# Patient Record
Sex: FEMALE | Race: WHITE | HISPANIC OR LATINO
[De-identification: names, ages, dates, MRNs, and addresses within clinical notes are randomized per-mention and may not be internally consistent; named-entity substitution may affect disease eponyms.]

---

## 2019-08-22 ENCOUNTER — TRANSCRIPTION ENCOUNTER (OUTPATIENT)
Age: 69
End: 2019-08-22

## 2020-11-14 ENCOUNTER — EMERGENCY (EMERGENCY)
Facility: HOSPITAL | Age: 70
LOS: 1 days | Discharge: HOME | End: 2020-11-14
Attending: EMERGENCY MEDICINE
Payer: COMMERCIAL

## 2020-11-14 VITALS
SYSTOLIC BLOOD PRESSURE: 130 MMHG | OXYGEN SATURATION: 98 % | TEMPERATURE: 98 F | DIASTOLIC BLOOD PRESSURE: 75 MMHG | HEART RATE: 70 BPM | RESPIRATION RATE: 16 BRPM

## 2020-11-14 VITALS
WEIGHT: 134.92 LBS | TEMPERATURE: 98 F | HEART RATE: 62 BPM | SYSTOLIC BLOOD PRESSURE: 123 MMHG | RESPIRATION RATE: 17 BRPM | DIASTOLIC BLOOD PRESSURE: 75 MMHG | HEIGHT: 61 IN | OXYGEN SATURATION: 97 %

## 2020-11-14 PROCEDURE — 72125 CT NECK SPINE W/O DYE: CPT | Mod: 26

## 2020-11-14 PROCEDURE — 70450 CT HEAD/BRAIN W/O DYE: CPT

## 2020-11-14 PROCEDURE — 73060 X-RAY EXAM OF HUMERUS: CPT | Mod: 26,RT

## 2020-11-14 PROCEDURE — 70486 CT MAXILLOFACIAL W/O DYE: CPT

## 2020-11-14 PROCEDURE — 73060 X-RAY EXAM OF HUMERUS: CPT

## 2020-11-14 PROCEDURE — 76377 3D RENDER W/INTRP POSTPROCES: CPT

## 2020-11-14 PROCEDURE — 73020 X-RAY EXAM OF SHOULDER: CPT | Mod: 26,59,RT

## 2020-11-14 PROCEDURE — 96372 THER/PROPH/DIAG INJ SC/IM: CPT

## 2020-11-14 PROCEDURE — 76377 3D RENDER W/INTRP POSTPROCES: CPT | Mod: 26

## 2020-11-14 PROCEDURE — 70486 CT MAXILLOFACIAL W/O DYE: CPT | Mod: 26

## 2020-11-14 PROCEDURE — 73020 X-RAY EXAM OF SHOULDER: CPT

## 2020-11-14 PROCEDURE — 99284 EMERGENCY DEPT VISIT MOD MDM: CPT

## 2020-11-14 PROCEDURE — 99284 EMERGENCY DEPT VISIT MOD MDM: CPT | Mod: 25

## 2020-11-14 PROCEDURE — 73030 X-RAY EXAM OF SHOULDER: CPT

## 2020-11-14 PROCEDURE — 73030 X-RAY EXAM OF SHOULDER: CPT | Mod: 26,RT

## 2020-11-14 PROCEDURE — 70450 CT HEAD/BRAIN W/O DYE: CPT | Mod: 26

## 2020-11-14 PROCEDURE — 72125 CT NECK SPINE W/O DYE: CPT

## 2020-11-14 RX ORDER — KETOROLAC TROMETHAMINE 30 MG/ML
15 SYRINGE (ML) INJECTION ONCE
Refills: 0 | Status: DISCONTINUED | OUTPATIENT
Start: 2020-11-14 | End: 2020-11-14

## 2020-11-14 RX ADMIN — Medication 15 MILLIGRAM(S): at 19:17

## 2020-11-14 NOTE — ED PROVIDER NOTE - OBJECTIVE STATEMENT
71yo F denies pmhx presents to ER s/p mechanical fall c/o of right shoulder pain and headache.  Patient was walking through store and tripped over a display causing her to fall forward hitting right side of forehead and landing on right shoulder.  NO LOC, was able to slowly get up.  Denies dizziness, visual changes, back pain, neck pain,  CP, SOB,  n/v, abdominal pain, extremity numbness/tingling, weakness, urinary/bowel incontinence 69yo F denies pmhx presents to ER s/p mechanical fall c/o of right shoulder pain and headache.  Patient was walking through store and tripped over a display causing her to fall forward hitting right side of forehead and landing on right shoulder.  NO LOC, was able to slowly get up.  Denies dizziness, visual changes, back pain, neck pain,  CP, SOB,  n/v, abdominal pain, extremity numbness/tingling, weakness, urinary/bowel incontinence. Denies anticoag use

## 2020-11-14 NOTE — ED PROVIDER NOTE - ATTENDING CONTRIBUTION TO CARE
Patient is a 69 yo F with no chronic medical problems here for evaluation of right shoulder pain after trip and fall while at a store. Patient reports she was in [employer] when she tripped over a metal elevated display and fell onto her right shoulder. She hit the right side of her head. Denies loc. Denies numbness or tingling in her arm. No hip pain. Denies chest pain, shortness of breath, nausea, vomiting. No abdominal pain.     VS noted  Gen. no acute distress, Non toxic   HEENT: EOMI, mmm, + swelling on right superior orbit, ttp  Spine: No C-T-L spine tenderness  Lungs: CTAB/L no C/ W /R   CVS: RRR   Abd; Soft non tender, non distended   Ext: no edema, right shoulder: ROM limited, unable to range. no ttp to elbow, right forearm  Skin: no rash  Neuro AAOx3 non focal clear speech  a/p: right shoulder injury - r/o fracture. Plan for XR of right shoulder and humerus. + head trauma with hematoma to right orbit. plan for ct head, ct-spine, ct max face.   - Luis Miguel TURNER

## 2020-11-14 NOTE — ED PROVIDER NOTE - PATIENT PORTAL LINK FT
You can access the FollowMyHealth Patient Portal offered by [facility] by registering at the following website: [email or web address]. By joining [employer]’s FollowMyHealth portal, you will also be able to view your health information using other applications (apps) compatible with our system.

## 2020-11-14 NOTE — CONSULT NOTE ADULT - SUBJECTIVE AND OBJECTIVE BOX
70yFemale c/o R shoulder pain  s/p mechanical fall. Patient was shopping when she tripped over a display, landing on her R shoulder and striking her head on the way down. Patient denies LOC. Patient denies numbness or tingling in the LUE. Patient denies any other injuries.    PMH:  Osteopenia on Vit D, unable to tolerate bisphosphonates    PSH:  Tubal ligation 1981  Fibrous dysplasia of breast s/p lumpectomy 1999  L tibial plateau ORIF 2006 ([facility])    AH:  codeine (Unknown)    Meds: See med rec    T(C): 36.8 (11-14-20 @ 21:59)  HR: 70 (11-14-20 @ 21:59)  BP: 130/75 (11-14-20 @ 21:59)  RR: 16 (11-14-20 @ 21:59)  SpO2: 98% (11-14-20 @ 21:59)  Wt(kg): --    PE RUE:  Skin intact, no ecchymosis   SILT axillary/med/rad/ulnar  +Motor AIN/PIN/Ulnar/Radial/Musc/Median,   painless wrist ROM, elbow ROM limited 2/2 pain at fracture site  no TTP over medial/lateral epicondyles, radial head, olecranon  shoulder ROM limited 2/2 pain,   2+radial pulse, soft compartments    Secondary:  Ecchymosis and swelling at lateral aspect of R eyebrow  No TTP over bony landmarks, SILT BL, ROM intact BL, distal pulses palpable.    Imaging:  XR demonstrating left 3-part proximal humerus fracture, humeral head located in glenoid      70yFemale with R proximal humerus fracture s/p mechanical fall    Pain control prn  NWB RUE in sling  Encourage active wrist/hand ROM  Ice/elevation  No acute orthopaedic intervention indicated at this time  Remainder of care per ED  Call Dr. Aburto's office on Monday for follow-up appt: [phone]     To be discussed with Dr. Aburto in AM 70yFemale c/o R shoulder pain  s/p mechanical fall. Patient was shopping when she tripped over a display, landing on her R shoulder and striking her head on the way down. Patient denies LOC. Patient denies numbness or tingling in the LUE. Patient denies any other injuries. She previously had her left tibial plateau fixed by Dr. Aburto.     PMH:  Osteopenia on Vit D, unable to tolerate bisphosphonates    PSH:  Tubal ligation 1981  Fibrous dysplasia of breast s/p lumpectomy 1999  L tibial plateau ORIF 2006 ([facility])    AH:  codeine (Unknown)    Meds: See med rec    T(C): 36.8 (11-14-20 @ 21:59)  HR: 70 (11-14-20 @ 21:59)  BP: 130/75 (11-14-20 @ 21:59)  RR: 16 (11-14-20 @ 21:59)  SpO2: 98% (11-14-20 @ 21:59)  Wt(kg): --    PE RUE:  Skin intact, no ecchymosis   SILT axillary/med/rad/ulnar  +Motor AIN/PIN/Ulnar/Radial/Musc/Median,   painless wrist ROM, elbow ROM limited 2/2 pain at fracture site  no TTP over medial/lateral epicondyles, radial head, olecranon  shoulder ROM limited 2/2 pain,   2+radial pulse, soft compartments    Secondary:  Ecchymosis and swelling at lateral aspect of R eyebrow  No TTP over bony landmarks, SILT BL, ROM intact BL, distal pulses palpable.    Imaging:  XR demonstrating left 3-part proximal humerus fracture, humeral head located in glenoid      70yFemale with R proximal humerus fracture s/p mechanical fall    Pain control prn  NWB RUE in sling  Encourage active wrist/hand ROM  Ice/elevation  No acute orthopaedic intervention indicated at this time  Remainder of care per ED  Call Dr. Aburto's office on Monday for follow-up appt: [phone]   Plan discussed with Dr. Aburto, will advise of any changes to the palm.

## 2020-11-14 NOTE — ED PROVIDER NOTE - CARE PLAN
Principal Discharge DX:	Humeral head fracture, right, closed, initial encounter  Goal:	placed in sling, d/c with follow up with orthopedics  Assessment and plan of treatment:	acute, impacted fracture of the humeral neck with extension to the greater tuberosity, placed in sling, d/c with follow up with orthopedics

## 2020-11-14 NOTE — ED PROVIDER NOTE - CARE PROVIDER_API CALL
Sami Aburto  ORTHOPAEDIC SURGERY  [address], [address]  [place] [address]  Phone: [phone]  Fax: [phone]  Follow Up Time:

## 2020-11-14 NOTE — ED PROVIDER NOTE - NSFOLLOWUPINSTRUCTIONS_ED_ALL_ED_FT
Please call Dr. Aburto's office on Monday for a follow up appointment. Continue to use the sling for comfort. You may take ibuprofen (upto 600 mg) every 6 hours for pain as well as tylenol (1000 mg) every 6 hours for pain.

## 2020-11-14 NOTE — ED PROVIDER NOTE - EYES, MLM
Clear bilaterally, pupils equal, round and reactive to light.  1cm round soft tissue hematoma to right upper orbital rim + tenderness

## 2020-11-14 NOTE — ED ADULT NURSE NOTE - OBJECTIVE STATEMENT
71 y/o F BIBA s/o witness trip and fall in [employer] over a display corner that her foot got stuck on, fell forward onto R shoulder and R orbital, no LOC, no anticoagulants. R orbital swelling and pain, no lac/bleeding/abrasion/ecchymosis noted to R orbital. R shoulder pain/guarding, unable to ROM due to pain, no ecchymosis, lac, bleeding, abrasion noted to R shoulder. Pt denies any injuries to legs/hips b/l. No LUE injury. Pt denies headache, dizziness, chest pain, palpitations, cough, SOB, abdominal pain, n/v/d, fevers, chills, weakness at this time. Pt changed to gown. Safety maintained.

## 2020-11-14 NOTE — ED PROVIDER NOTE - CLINICAL SUMMARY MEDICAL DECISION MAKING FREE TEXT BOX
right shoulder injury - r/o fracture. Plan for XR of right shoulder and humerus. + head trauma with hematoma to right orbit. plan for ct head, ct-spine, ct max face.   XR shows acute, impacted fracture of the humeral neck with extension to the greater tuberosity, placed in sling, d/c with follow up with orthopedics right shoulder injury - r/o fracture. Plan for XR of right shoulder and humerus. + head trauma with hematoma to right orbit. plan for ct head, ct-spine, ct max face.  XR shows acute, impacted fracture of the humeral neck with extension to the greater tuberosity, placed in sling, d/c with follow up with orthopedics

## 2020-11-14 NOTE — ED PROVIDER NOTE - MUSCULOSKELETAL MINIMAL EXAM
Dec ROM at right shoulder due to pain.  + swelling at proximal humerus + tenderness at anterior shoulder and proximal humers.  FROM elbow and wrist, sensation intact +radial pulses. FROM LUE, 5/5strength, sensstion intat,  LE; FROM 5/5 strength, sensation intact.  No bony tenderness,/neck supple

## 2020-11-14 NOTE — ED PROCEDURE NOTE - ATTENDING CONTRIBUTION TO CARE
I have participated in and supervised all key portions of the above procedures and agree with the above documentation. - Luis Miguel TURNER

## 2020-11-14 NOTE — ED PROVIDER NOTE - PROGRESS NOTE DETAILS
reviewed imaging, placed patient in sling, consulted ortho Luis Miguel TURNER: Patient seen by ortho, recommending that she be discharged with follow up with orthopedics, Dr. Aburto.

## 2020-11-14 NOTE — ED PROVIDER NOTE - PLAN OF CARE
placed in sling, d/c with follow up with orthopedics acute, impacted fracture of the humeral neck with extension to the greater tuberosity, placed in sling, d/c with follow up with orthopedics

## 2020-11-14 NOTE — ED ADULT NURSE NOTE - NSIMPLEMENTINTERV_GEN_ALL_ED
Implemented All Fall Risk Interventions:  Golden to call system. Call bell, personal items and telephone within reach. Instruct patient to call for assistance. Room bathroom lighting operational. Non-slip footwear when patient is off stretcher. Physically safe environment: no spills, clutter or unnecessary equipment. Stretcher in lowest position, wheels locked, appropriate side rails in place. Provide visual cue, wrist band, yellow gown, etc. Monitor gait and stability. Monitor for mental status changes and reorient to person, place, and time. Review medications for side effects contributing to fall risk. Reinforce activity limits and safety measures with patient and family.

## 2020-11-16 ENCOUNTER — APPOINTMENT (OUTPATIENT)
Dept: ORTHOPEDIC SURGERY | Facility: CLINIC | Age: 70
End: 2020-11-16
Payer: COMMERCIAL

## 2020-11-16 DIAGNOSIS — Z91.81 HISTORY OF FALLING: ICD-10-CM

## 2020-11-16 PROCEDURE — 99202 OFFICE O/P NEW SF 15 MIN: CPT

## 2020-11-16 PROCEDURE — 73030 X-RAY EXAM OF SHOULDER: CPT | Mod: RT

## 2020-11-16 PROCEDURE — 99072 ADDL SUPL MATRL&STAF TM PHE: CPT

## 2020-11-16 NOTE — ADDENDUM
[FreeTextEntry1] : I, Kimmy Ambriz wrote this note acting as a scribe for Dr. Sami Aburto on Nov 16, 2020.

## 2020-11-16 NOTE — PHYSICAL EXAM
[de-identified] : Patient is WDWN, alert, and in no acute distress. Breathing is unlabored. She is grossly oriented to person, place, and time. \par \par Right shoulder:Ecchymosis, edema and tenderness in right shoulder and upper arm\par ROM limited by pain\par Right hand FROM, normal sensation\par  [de-identified] : AP, transcapular, and axillary views of the right shoulder were obtained and revealed comminuted displaced humeral neck fracture. \par There is proximal displacement of the greater tuberosity\par

## 2020-11-16 NOTE — HISTORY OF PRESENT ILLNESS
[de-identified] : Patient is a 70 year old female who presents for evaluation of Rt shoulder pain due to R humeral neck fracture sp fall on Sat 11/14/20. Pt was in a store, carrying goods, when she tripped over the floor and landed forwards sustaining head and right shoulder trauma. She was taken to ED where NCHCT was negative for acute bleed. R shoulder xray with displaced humeral neck fracture. She was placed in a sling and referred to ortho for further eval and management. Pt is taking Ibuprofen for pain relief. No numbness or RUE weakness. She also notes 4th finger distal ecchymosis without pain or edema.She states that she is an active person.

## 2020-11-16 NOTE — END OF VISIT
[FreeTextEntry3] : I, Sami Aburto MD, ordering physician, have read and attest that all the information, medical decision making and discharge instructions within are true and accurate.

## 2020-11-16 NOTE — DISCUSSION/SUMMARY
[de-identified] : The underlying pathophysiology was reviewed with the patient. Treatment options were discussed including sling immobilization  and surgical intervention-ORIF. Thhe patient was advised surgery in order to optimize right shoulder function.\par \par Patient was advised that the fracture will most likely  heal without surgery but she may lose function of her right shoulder.\par All of the patient's questions have been answered. \par Patient has decided to wait a week to get repeat x-rays to determine if she would like surgery.\par A sling was given in the office. \par Patient is advised to sleep sitting up. \par NSAIDs as tolerated. \par Follow Up in a week for repeat x-rays.

## 2020-11-23 ENCOUNTER — APPOINTMENT (OUTPATIENT)
Dept: ORTHOPEDIC SURGERY | Facility: CLINIC | Age: 70
End: 2020-11-23
Payer: COMMERCIAL

## 2020-11-23 PROCEDURE — 73030 X-RAY EXAM OF SHOULDER: CPT | Mod: RT

## 2020-11-23 PROCEDURE — 99214 OFFICE O/P EST MOD 30 MIN: CPT

## 2020-11-23 NOTE — HISTORY OF PRESENT ILLNESS
[de-identified] : Patient is a 70 year old female who presents for evaluation of Rt shoulder pain due to a fall on Sat 11/14/20. Pt was in a store, carrying goods, when she tripped over the floor and landed forwards sustaining head and right shoulder trauma. She was taken to ED where head CT was negative for acute bleed. R shoulder xray revealed a left displaced humeral neck fracture. She was placed in a sling and referred to ortho for further eval and management. Pt is taking Ibuprofen for pain relief. No numbness or RUE weakness. She also notes 4th finger distal ecchymosis without pain or edema.She states that she is an active person. She returns a week later for repeat x-rays. She reports taking Ibuprofen.She was seen last week when Xrays revealed acceptable alignment. She reports increased pain in the right shoulder.

## 2020-11-23 NOTE — PHYSICAL EXAM
[de-identified] : Patient is WDWN, alert, and in no acute distress. Breathing is unlabored. She is grossly oriented to person, place, and time. \par \par Right shoulder:Ecchymosis, edema and tenderness in right shoulder and upper arm\par ROM limited by pain\par Right hand FROM, normal sensation\par  [de-identified] : AP, transcapular, and axillary views of the right shoulder were obtained and revealed comminuted displaced humeral neck fracture. There is proximal displacement of the greater tuberosity. There is significant displacement of the surgical neck fracture compared to the previous x-rays. \par

## 2020-11-23 NOTE — DISCUSSION/SUMMARY
[de-identified] : The underlying pathophysiology was reviewed with the patient. Treatment options were discussed including; nonsurgical and surgical intervention. \par \par The patient wishes to proceed with ORIF of the  comminuted displaced humeral neck fracture at this time. The risks and benefits were reviewed with the patient. All of her questions were answered. She will meet with our surgical scheduler.

## 2020-11-23 NOTE — RETURN TO WORK/SCHOOL
[FreeTextEntry1] : Ms. CK MONTANEZ was seen in the office today on 11/23/2020 and 11/16/2020 evaluated by me for an Orthopedic visit. Please be advised that she will be unable to work until farther notice due to her comminuted displaced humeral neck fracture.\par \par Sincerely, \par Sami Aburto MD

## 2020-11-23 NOTE — ADDENDUM
[FreeTextEntry1] : I, Kimmy Ambriz wrote this note acting as a scribe for Dr. Sami Aburto on Nov 23, 2020.

## 2020-11-25 LAB
ALBUMIN SERPL ELPH-MCNC: 4.2 G/DL
ALP BLD-CCNC: 67 U/L
ALT SERPL-CCNC: 16 U/L
ANION GAP SERPL CALC-SCNC: 9 MMOL/L
APTT BLD: 30.1 SEC
AST SERPL-CCNC: 17 U/L
BASOPHILS # BLD AUTO: 0.06 K/UL
BASOPHILS NFR BLD AUTO: 0.7 %
BILIRUB SERPL-MCNC: 0.3 MG/DL
BUN SERPL-MCNC: 14 MG/DL
CALCIUM SERPL-MCNC: 9.1 MG/DL
CHLORIDE SERPL-SCNC: 104 MMOL/L
CO2 SERPL-SCNC: 28 MMOL/L
CREAT SERPL-MCNC: 0.58 MG/DL
EOSINOPHIL # BLD AUTO: 0.17 K/UL
EOSINOPHIL NFR BLD AUTO: 2 %
GLUCOSE SERPL-MCNC: 93 MG/DL
HCT VFR BLD CALC: 40.3 %
HGB BLD-MCNC: 13 G/DL
IMM GRANULOCYTES NFR BLD AUTO: 0.2 %
INR PPP: 1.01 RATIO
LYMPHOCYTES # BLD AUTO: 1.53 K/UL
LYMPHOCYTES NFR BLD AUTO: 18 %
MAN DIFF?: NORMAL
MCHC RBC-ENTMCNC: 30.4 PG
MCHC RBC-ENTMCNC: 32.3 GM/DL
MCV RBC AUTO: 94.4 FL
MONOCYTES # BLD AUTO: 0.86 K/UL
MONOCYTES NFR BLD AUTO: 10.1 %
NEUTROPHILS # BLD AUTO: 5.87 K/UL
NEUTROPHILS NFR BLD AUTO: 69 %
PLATELET # BLD AUTO: 301 K/UL
POTASSIUM SERPL-SCNC: 4.3 MMOL/L
PROT SERPL-MCNC: 6.8 G/DL
PT BLD: 11.9 SEC
RBC # BLD: 4.27 M/UL
RBC # FLD: 12.5 %
SODIUM SERPL-SCNC: 141 MMOL/L
WBC # FLD AUTO: 8.51 K/UL

## 2020-11-27 ENCOUNTER — OUTPATIENT (OUTPATIENT)
Dept: OUTPATIENT SERVICES | Facility: HOSPITAL | Age: 70
LOS: 1 days | End: 2020-11-27
Payer: MEDICARE

## 2020-11-27 VITALS
HEART RATE: 78 BPM | OXYGEN SATURATION: 99 % | WEIGHT: 134.92 LBS | TEMPERATURE: 98 F | SYSTOLIC BLOOD PRESSURE: 154 MMHG | DIASTOLIC BLOOD PRESSURE: 76 MMHG | HEIGHT: 61 IN | RESPIRATION RATE: 15 BRPM

## 2020-11-27 DIAGNOSIS — Z98.51 TUBAL LIGATION STATUS: Chronic | ICD-10-CM

## 2020-11-27 DIAGNOSIS — S42.211A UNSPECIFIED DISPLACED FRACTURE OF SURGICAL NECK OF RIGHT HUMERUS, INITIAL ENCOUNTER FOR CLOSED FRACTURE: ICD-10-CM

## 2020-11-27 DIAGNOSIS — Z98.890 OTHER SPECIFIED POSTPROCEDURAL STATES: Chronic | ICD-10-CM

## 2020-11-27 DIAGNOSIS — S42.231A 3-PART FRACTURE OF SURGICAL NECK OF RIGHT HUMERUS, INITIAL ENCOUNTER FOR CLOSED FRACTURE: ICD-10-CM

## 2020-11-27 NOTE — H&P PST ADULT - NSANTHOSAYNRD_GEN_A_CORE
No. LALITA screening performed.  STOP BANG Legend: 0-2 = LOW Risk; 3-4 = INTERMEDIATE Risk; 5-8 = HIGH Risk

## 2020-11-27 NOTE — H&P PST ADULT - MUSCULOSKELETAL
details… detailed exam no joint warmth/decreased ROM due to pain/diminished strength/right arm/no joint erythema/no calf tenderness/decreased ROM/no joint swelling

## 2020-11-27 NOTE — H&P PST ADULT - NSICDXPASTMEDICALHX_GEN_ALL_CORE_FT
PAST MEDICAL HISTORY:  Vitamin D deficiency      PAST MEDICAL HISTORY:  Dry eye syndrome of both eyes     Vitamin D deficiency

## 2020-11-27 NOTE — H&P PST ADULT - NSICDXPASTSURGICALHX_GEN_ALL_CORE_FT
PAST SURGICAL HISTORY:  S/P ORIF (open reduction internal fixation) fracture 2006- left tibial plateau    S/P right breast biopsy 1990- benign    S/P tubal ligation 1981

## 2020-11-27 NOTE — H&P PST ADULT - NSICDXFAMILYHX_GEN_ALL_CORE_FT
FAMILY HISTORY:  Family history of pacemaker, father-  Family history of stroke, father-  FHx: aortic aneurysm, father-   FHx: non-Hodgkin's lymphoma, mother -

## 2020-11-27 NOTE — H&P PST ADULT - NSICDXPROBLEM_GEN_ALL_CORE_FT
PROBLEM DIAGNOSES  Problem: Fracture of surgical neck of right humerus  Assessment and Plan: Open Reduction Internal Fixation RIGHT Proximal Humerus with Fibular Allograft on 12/04/2020.

## 2020-11-27 NOTE — H&P PST ADULT - ASSESSMENT
69yo female patient scheduled for surgery on 12/04/2020. She will obtain Medical Clearance from her PMD. She will be NPO as per Anesthesia and will take no medication on AM of surgery. She will hold Ibuprofen starting today. All pre-op instructions reviewed with patient. As per Covid 19 Protocol, she will be screened for Covid on 12/2/20, her history was obtained via telephone interview and PE will be done on admission.

## 2020-11-27 NOTE — H&P PST ADULT - RS GEN PE MLT RESP DETAILS PC
no rhonchi/breath sounds equal/clear to auscultation bilaterally/airway patent/no rales/respirations non-labored/no wheezes/good air movement

## 2020-11-27 NOTE — H&P PST ADULT - HISTORY OF PRESENT ILLNESS
71yo ambidextrous female patient who states that she fell on 11/14/2020, she was taken to [facility] via ambulance. An x-ray revealed fracture jof her right humerus and she opted to f/u with Dr. Aburto who has been her surgeon in the past. She is having pain which she rates at 1/10 up to 7-8/10. She is taking Ibuprofen 200 or 400mg with some relief. 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 An x-ray revealed fracture of her right humerus and she opted to f/u with Dr. Aburto who has been her surgeon in the past. She is having pain which she rates at 1/10 up to 7-8/10. She is taking Ibuprofen 200 or 400mg with some relief.

## 2020-11-30 ENCOUNTER — TRANSCRIPTION ENCOUNTER (OUTPATIENT)
Age: 70
End: 2020-11-30

## 2020-12-01 DIAGNOSIS — Z01.818 ENCOUNTER FOR OTHER PREPROCEDURAL EXAMINATION: ICD-10-CM

## 2020-12-01 DIAGNOSIS — Z11.59 ENCOUNTER FOR SCREENING FOR OTHER VIRAL DISEASES: ICD-10-CM

## 2020-12-01 PROCEDURE — G0463: CPT

## 2020-12-01 NOTE — ASU DISCHARGE PLAN (ADULT/PEDIATRIC) - ASU DC SPECIAL INSTRUCTIONSFT
rest  ice  sling  wiggle finger wrist and hand   NON- weight bearing on operated extremity  keep area clean and dry  elevate  Follow up [facility] office  x 2 weeks  Follow up Primary Care MD following discharge

## 2020-12-01 NOTE — ASU DISCHARGE PLAN (ADULT/PEDIATRIC) - CARE PROVIDER_API CALL
Sami Aburto  ORTHOPAEDIC SURGERY  [address], [address]  [place] [address]  Phone: [phone]  Fax: [phone]  Follow Up Time:

## 2020-12-01 NOTE — ASU DISCHARGE PLAN (ADULT/PEDIATRIC) - CALL YOUR DOCTOR IF YOU HAVE ANY OF THE FOLLOWING:
Nausea and vomiting that does not stop/Swelling that gets worse/Fever greater than (need to indicate Fahrenheit or Celsius)/Numbness, tingling, color or temperature change to extremity/Pain not relieved by Medications/Wound/Surgical Site with redness, or foul smelling discharge or pus/Bleeding that does not stop

## 2020-12-02 ENCOUNTER — OUTPATIENT (OUTPATIENT)
Dept: OUTPATIENT SERVICES | Facility: HOSPITAL | Age: 70
LOS: 1 days | End: 2020-12-02
Payer: MEDICARE

## 2020-12-02 DIAGNOSIS — Z98.890 OTHER SPECIFIED POSTPROCEDURAL STATES: Chronic | ICD-10-CM

## 2020-12-02 DIAGNOSIS — Z98.51 TUBAL LIGATION STATUS: Chronic | ICD-10-CM

## 2020-12-02 DIAGNOSIS — Z11.59 ENCOUNTER FOR SCREENING FOR OTHER VIRAL DISEASES: ICD-10-CM

## 2020-12-02 LAB — SARS-COV-2 RNA SPEC QL NAA+PROBE: SIGNIFICANT CHANGE UP

## 2020-12-02 PROCEDURE — U0003: CPT

## 2020-12-03 ENCOUNTER — TRANSCRIPTION ENCOUNTER (OUTPATIENT)
Age: 70
End: 2020-12-03

## 2020-12-03 NOTE — ASU PATIENT PROFILE, ADULT - PSH
S/P ORIF (open reduction internal fixation) fracture  2006- left tibial plateau  S/P right breast biopsy  1990- benign  S/P tubal ligation  1981

## 2020-12-04 ENCOUNTER — OUTPATIENT (OUTPATIENT)
Dept: OUTPATIENT SERVICES | Facility: HOSPITAL | Age: 70
LOS: 1 days | End: 2020-12-04
Payer: MEDICARE

## 2020-12-04 ENCOUNTER — RESULT REVIEW (OUTPATIENT)
Age: 70
End: 2020-12-04

## 2020-12-04 ENCOUNTER — APPOINTMENT (OUTPATIENT)
Dept: ORTHOPEDIC SURGERY | Facility: HOSPITAL | Age: 70
End: 2020-12-04

## 2020-12-04 VITALS — TEMPERATURE: 98 F

## 2020-12-04 VITALS
WEIGHT: 132.28 LBS | HEART RATE: 78 BPM | SYSTOLIC BLOOD PRESSURE: 154 MMHG | TEMPERATURE: 98 F | DIASTOLIC BLOOD PRESSURE: 76 MMHG | RESPIRATION RATE: 15 BRPM | OXYGEN SATURATION: 99 % | HEIGHT: 60 IN

## 2020-12-04 DIAGNOSIS — Z98.51 TUBAL LIGATION STATUS: Chronic | ICD-10-CM

## 2020-12-04 DIAGNOSIS — Z98.890 OTHER SPECIFIED POSTPROCEDURAL STATES: Chronic | ICD-10-CM

## 2020-12-04 DIAGNOSIS — S42.231A 3-PART FRACTURE OF SURGICAL NECK OF RIGHT HUMERUS, INITIAL ENCOUNTER FOR CLOSED FRACTURE: ICD-10-CM

## 2020-12-04 PROCEDURE — C1713: CPT

## 2020-12-04 PROCEDURE — 23615 OPTX PROX HUMRL FX W/INT FIX: CPT | Mod: RT

## 2020-12-04 PROCEDURE — 76000 FLUOROSCOPY <1 HR PHYS/QHP: CPT

## 2020-12-04 PROCEDURE — 73060 X-RAY EXAM OF HUMERUS: CPT | Mod: 26,RT

## 2020-12-04 PROCEDURE — 73060 X-RAY EXAM OF HUMERUS: CPT

## 2020-12-04 PROCEDURE — C1889: CPT

## 2020-12-04 RX ORDER — CHLORHEXIDINE GLUCONATE 213 G/1000ML
1 SOLUTION TOPICAL DAILY
Refills: 0 | Status: DISCONTINUED | OUTPATIENT
Start: 2020-12-04 | End: 2020-12-04

## 2020-12-04 RX ORDER — APREPITANT 80 MG/1
40 CAPSULE ORAL ONCE
Refills: 0 | Status: COMPLETED | OUTPATIENT
Start: 2020-12-04 | End: 2020-12-04

## 2020-12-04 RX ORDER — SODIUM CHLORIDE 9 MG/ML
1000 INJECTION, SOLUTION INTRAVENOUS
Refills: 0 | Status: DISCONTINUED | OUTPATIENT
Start: 2020-12-04 | End: 2020-12-07

## 2020-12-04 RX ORDER — CEFAZOLIN SODIUM 1 G
2000 VIAL (EA) INJECTION ONCE
Refills: 0 | Status: COMPLETED | OUTPATIENT
Start: 2020-12-04 | End: 2020-12-04

## 2020-12-04 RX ORDER — TRAMADOL HYDROCHLORIDE 50 MG/1
1 TABLET ORAL
Qty: 15 | Refills: 0
Start: 2020-12-04

## 2020-12-04 RX ORDER — IBUPROFEN 200 MG
1 TABLET ORAL
Qty: 30 | Refills: 0
Start: 2020-12-04

## 2020-12-04 RX ORDER — HYDROMORPHONE HYDROCHLORIDE 2 MG/ML
0.5 INJECTION INTRAMUSCULAR; INTRAVENOUS; SUBCUTANEOUS
Refills: 0 | Status: DISCONTINUED | OUTPATIENT
Start: 2020-12-04 | End: 2020-12-07

## 2020-12-04 RX ORDER — IBUPROFEN 200 MG
2 TABLET ORAL
Qty: 0 | Refills: 0 | DISCHARGE

## 2020-12-04 RX ORDER — OXYCODONE HYDROCHLORIDE 5 MG/1
1 TABLET ORAL
Qty: 15 | Refills: 0
Start: 2020-12-04

## 2020-12-04 RX ORDER — ACETAMINOPHEN 500 MG
1000 TABLET ORAL ONCE
Refills: 0 | Status: COMPLETED | OUTPATIENT
Start: 2020-12-04 | End: 2020-12-04

## 2020-12-04 RX ADMIN — SODIUM CHLORIDE 75 MILLILITER(S): 9 INJECTION, SOLUTION INTRAVENOUS at 17:21

## 2020-12-04 RX ADMIN — APREPITANT 40 MILLIGRAM(S): 80 CAPSULE ORAL at 11:46

## 2020-12-04 RX ADMIN — CHLORHEXIDINE GLUCONATE 1 APPLICATION(S): 213 SOLUTION TOPICAL at 11:46

## 2020-12-04 NOTE — BRIEF OPERATIVE NOTE - NSICDXBRIEFPREOP_GEN_ALL_CORE_FT
PRE-OP DIAGNOSIS:  Closed fracture of proximal end of right humerus 04-Dec-2020 16:53:11  Joon Rogers

## 2020-12-04 NOTE — BRIEF OPERATIVE NOTE - NSICDXBRIEFPROCEDURE_GEN_ALL_CORE_FT
PROCEDURES:  Open reduction and internal fixation (ORIF) of fracture of right proximal humerus 04-Dec-2020 16:53:35  Joon Rogers

## 2020-12-04 NOTE — BRIEF OPERATIVE NOTE - NSICDXBRIEFPOSTOP_GEN_ALL_CORE_FT
POST-OP DIAGNOSIS:  Closed fracture of proximal end of right humerus 04-Dec-2020 16:52:57  Joon Rogers

## 2020-12-07 PROBLEM — E55.9 VITAMIN D DEFICIENCY, UNSPECIFIED: Chronic | Status: ACTIVE | Noted: 2020-11-27

## 2020-12-07 PROBLEM — H04.123 DRY EYE SYNDROME OF BILATERAL LACRIMAL GLANDS: Chronic | Status: ACTIVE | Noted: 2020-11-27

## 2020-12-17 ENCOUNTER — APPOINTMENT (OUTPATIENT)
Dept: ORTHOPEDIC SURGERY | Facility: CLINIC | Age: 70
End: 2020-12-17
Payer: COMMERCIAL

## 2020-12-17 DIAGNOSIS — Z78.9 OTHER SPECIFIED HEALTH STATUS: ICD-10-CM

## 2020-12-17 DIAGNOSIS — Z80.7 FAMILY HISTORY OF OTHER MALIGNANT NEOPLASMS OF LYMPHOID, HEMATOPOIETIC AND RELATED TISSUES: ICD-10-CM

## 2020-12-17 DIAGNOSIS — Z82.62 FAMILY HISTORY OF OSTEOPOROSIS: ICD-10-CM

## 2020-12-17 DIAGNOSIS — Z60.2 PROBLEMS RELATED TO LIVING ALONE: ICD-10-CM

## 2020-12-17 PROCEDURE — 99024 POSTOP FOLLOW-UP VISIT: CPT

## 2020-12-17 PROCEDURE — 73030 X-RAY EXAM OF SHOULDER: CPT | Mod: RT

## 2020-12-17 SDOH — SOCIAL STABILITY - SOCIAL INSECURITY: PROBLEMS RELATED TO LIVING ALONE: Z60.2

## 2020-12-17 NOTE — HISTORY OF PRESENT ILLNESS
[de-identified] : s/p ORIF right proximal humerus with fibular allograft strut graft and reinforcement with rotator cuff repair [de-identified] : The patient is a 70 year old female who returns for the 1st postoperative visit after undergoing ORIF right proximal humerus with fibular allograft strut graft and reinforcement with rotator cuff repair at [facility]. The surgery was performed on 12/04/2020. The patient is recovering at home. She reports mild postoperative pain. She presents with a sling. She states that she has pain in her shoulder.  [de-identified] : Patient is WDWN, alert, and in no acute distress. Breathing is unlabored. She is grossly oriented to person, place, and time. \par \par Incision is healed. Steri strips are in place. There are no signs of infection. Normal amount of postoperative edema and tenderness present.  [de-identified] : AP, lateral and oblique views of the right humerus were obtained and revealed displaced comminuted proximal humerus fracture stabilized with a Synthes proximal humerus plate and allograft strut graft.  [de-identified] : Massage the scar with vitamin E oil once the strips have fallen off. Gentle range of motion exercises were encouraged. Patient was encouraged to increase use of the hand. Patient was informed that the sling is no longer needed. Follow up in 4 weeks.

## 2020-12-17 NOTE — ADDENDUM
[FreeTextEntry1] : I, Kimmy Ambriz wrote this note acting as a scribe for Dr. Sami Aburto on Dec 17, 2020.

## 2021-01-14 ENCOUNTER — APPOINTMENT (OUTPATIENT)
Dept: ORTHOPEDIC SURGERY | Facility: CLINIC | Age: 71
End: 2021-01-14
Payer: MEDICARE

## 2021-01-14 PROCEDURE — 99024 POSTOP FOLLOW-UP VISIT: CPT

## 2021-01-14 PROCEDURE — 73030 X-RAY EXAM OF SHOULDER: CPT | Mod: RT

## 2021-01-14 NOTE — ADDENDUM
[FreeTextEntry1] : I, Kimmy Ambriz wrote this note acting as a scribe for Dr. Sami Aburto on Jan 14, 2021.

## 2021-01-14 NOTE — HISTORY OF PRESENT ILLNESS
[de-identified] : s/p ORIF right proximal humerus with fibular allograft strut graft and reinforcement with rotator cuff repair [de-identified] : The patient is a 70 year old female who returns for the 2nd postoperative visit after undergoing ORIF right proximal humerus with fibular allograft strut graft and reinforcement with rotator cuff repair at [facility]. The surgery was performed on 12/04/2020. The patient is recovering at home. She reports mild postoperative pain. She presents with a sling. She states that she has pain in her shoulder. She reports having limited ROM and would like PT to increase her ROM. She states that she is experiencing a pulling sensation in the right shoulder.  [de-identified] : Patient is WDWN, alert, and in no acute distress. Breathing is unlabored. She is grossly oriented to person, place, and time. \par \par Incision is healed. There are no signs of infection. Normal amount of postoperative edema and tenderness present.  [de-identified] : AP, lateral and oblique views of the right humerus were obtained and revealed displaced comminuted proximal humerus fracture stabilized with a Synthes proximal humerus plate and allograft strut graft. Fracture is healed.No change in hardware position.. [de-identified] : Massage the scar with vitamin E oil. \par The patient wishes to proceed with physical therapy, right shoulder ROM. A script was given. \par Range of motion and strengthening exercises were reviewed. \par Patient is advised to use a hot shower to stretch. \par NSAIDs as tolerated. \par Follow Up in 3 months for repeat x-rays.

## 2021-03-11 ENCOUNTER — APPOINTMENT (OUTPATIENT)
Dept: INTERNAL MEDICINE | Facility: CLINIC | Age: 71
End: 2021-03-11
Payer: MEDICARE

## 2021-03-11 VITALS
HEART RATE: 83 BPM | BODY MASS INDEX: 26.06 KG/M2 | DIASTOLIC BLOOD PRESSURE: 80 MMHG | WEIGHT: 138 LBS | TEMPERATURE: 98 F | HEIGHT: 61 IN | SYSTOLIC BLOOD PRESSURE: 140 MMHG | OXYGEN SATURATION: 98 %

## 2021-03-11 DIAGNOSIS — S42.301A UNSPECIFIED FRACTURE OF SHAFT OF HUMERUS, RIGHT ARM, INITIAL ENCOUNTER FOR CLOSED FRACTURE: ICD-10-CM

## 2021-03-11 PROCEDURE — 36415 COLL VENOUS BLD VENIPUNCTURE: CPT

## 2021-03-11 PROCEDURE — 99072 ADDL SUPL MATRL&STAF TM PHE: CPT

## 2021-03-11 PROCEDURE — 93000 ELECTROCARDIOGRAM COMPLETE: CPT

## 2021-03-11 PROCEDURE — G0438: CPT

## 2021-03-11 PROCEDURE — 99213 OFFICE O/P EST LOW 20 MIN: CPT | Mod: 25

## 2021-03-11 NOTE — HEALTH RISK ASSESSMENT
[Very Good] : ~his/her~  mood as very good [] : No [Yes] : Yes [Monthly or less (1 pt)] : Monthly or less (1 point) [No] : In the past 12 months have you used drugs other than those required for medical reasons? No [Any fall with injury in past year] : Patient reported fall with injury in the past year [0] : 2) Feeling down, depressed, or hopeless: Not at all (0) [Audit-CScore] : 1 [de-identified] : walking [[id number]] : 0 [Patient declined mammogram] : Patient declined mammogram [Patient declined PAP Smear] : Patient declined PAP Smear [Patient declined bone density test] : Patient declined bone density test [Patient reported colonoscopy was normal] : Patient reported colonoscopy was normal [Employed] : employed [Reports changes in hearing] : Reports no changes in hearing [Reports changes in vision] : Reports no changes in vision [Reports changes in dental health] : Reports no changes in dental health [Smoke Detector] : smoke detector [Carbon Monoxide Detector] : carbon monoxide detector [Seat Belt] :  uses seat belt [Sunscreen] : uses sunscreen [TB Exposure] : is not being exposed to tuberculosis [ColonoscopyDate] : 2016

## 2021-03-11 NOTE — PHYSICAL EXAM
[No Acute Distress] : no acute distress [Well Nourished] : well nourished [Well Developed] : well developed [Well-Appearing] : well-appearing [Normal Sclera/Conjunctiva] : normal sclera/conjunctiva [PERRL] : pupils equal round and reactive to light [EOMI] : extraocular movements intact [Normal Outer Ear/Nose] : the outer ears and nose were normal in appearance [Normal Oropharynx] : the oropharynx was normal [No JVD] : no jugular venous distention [No Lymphadenopathy] : no lymphadenopathy [Supple] : supple [Thyroid Normal, No Nodules] : the thyroid was normal and there were no nodules present [No Respiratory Distress] : no respiratory distress  [No Accessory Muscle Use] : no accessory muscle use [Clear to Auscultation] : lungs were clear to auscultation bilaterally [Normal Rate] : normal rate  [Regular Rhythm] : with a regular rhythm [Normal S1, S2] : normal S1 and S2 [No Murmur] : no murmur heard [No Carotid Bruits] : no carotid bruits [No Abdominal Bruit] : a ~M bruit was not heard ~T in the abdomen [No Varicosities] : no varicosities [Pedal Pulses Present] : the pedal pulses are present [No Edema] : there was no peripheral edema [No Palpable Aorta] : no palpable aorta [No Extremity Clubbing/Cyanosis] : no extremity clubbing/cyanosis [Soft] : abdomen soft [Non Tender] : non-tender [Non-distended] : non-distended [No HSM] : no HSM [Normal Bowel Sounds] : normal bowel sounds [Normal Posterior Cervical Nodes] : no posterior cervical lymphadenopathy [Normal Anterior Cervical Nodes] : no anterior cervical lymphadenopathy [No CVA Tenderness] : no CVA  tenderness [No Spinal Tenderness] : no spinal tenderness [No Joint Swelling] : no joint swelling [Grossly Normal Strength/Tone] : grossly normal strength/tone [No Rash] : no rash [Coordination Grossly Intact] : coordination grossly intact [No Focal Deficits] : no focal deficits [Normal Gait] : normal gait [Deep Tendon Reflexes (DTR)] : deep tendon reflexes were 2+ and symmetric [Normal Affect] : the affect was normal [Normal Insight/Judgement] : insight and judgment were intact [Normal Appearance] : normal in appearance [No Masses] : no palpable masses [No Nipple Discharge] : no nipple discharge [No Axillary Lymphadenopathy] : no axillary lymphadenopathy

## 2021-03-11 NOTE — HISTORY OF PRESENT ILLNESS
[de-identified] : New pt c/o\par - fracture of right femurs- under ortho care\par - palpitations- under cardiologist care\par \par

## 2021-03-16 LAB
25(OH)D3 SERPL-MCNC: 58.7 NG/ML
CHOLEST SERPL-MCNC: 250 MG/DL
HDLC SERPL-MCNC: 70 MG/DL
LDLC SERPL CALC-MCNC: 159 MG/DL
NONHDLC SERPL-MCNC: 180 MG/DL
SAVE SPECIMEN: NORMAL
TRIGL SERPL-MCNC: 107 MG/DL
TSH SERPL-ACNC: 1.35 UIU/ML

## 2021-04-01 ENCOUNTER — APPOINTMENT (OUTPATIENT)
Dept: ORTHOPEDIC SURGERY | Facility: CLINIC | Age: 71
End: 2021-04-01
Payer: MEDICARE

## 2021-04-01 PROCEDURE — 99072 ADDL SUPL MATRL&STAF TM PHE: CPT

## 2021-04-01 PROCEDURE — 73030 X-RAY EXAM OF SHOULDER: CPT | Mod: RT

## 2021-04-01 PROCEDURE — 99213 OFFICE O/P EST LOW 20 MIN: CPT

## 2021-04-02 NOTE — HISTORY OF PRESENT ILLNESS
[de-identified] : CK MONTANEZ is a LHD 71 year female presents for follow-up evaluation of ORIF right proximal humerus with fibular allograft strut graft and reinforcement with rotator cuff repair at [facility]. The surgery was performed on 12/04/2020. Patient is here today for repeat XR. She also would like to know if she is able to do weight-bearing exercises. She reports improvements in ROM as a result of going to PT and doing at home exercises. Patient denies taking medication for pain. Patient reports that she has just started a new job and does not feel limited.

## 2021-04-02 NOTE — PHYSICAL EXAM
[de-identified] : Patient is WDWN, alert, and in no acute distress. Breathing is unlabored. She is grossly oriented to person, place, \par and time.\par \par Right Shoulder:\par No sign of infection\par Keloid present along incision site.\par Flexion 100.abduction 90\par  [de-identified] : AP, lateral and oblique views of the right humerus and shoulder were obtained and revealed displaced comminuted proximal humerus fracture stabilized with a Synthes proximal humerus plate and allograft strut graft. Fracture is healed. No change in hardware position..

## 2021-04-02 NOTE — ADDENDUM
[FreeTextEntry1] : I, Lucia Bourgeois wrote this note acting as a scribe for Dr. Sami Aburto on Apr 01, 2021.\par \par

## 2021-04-02 NOTE — END OF VISIT
[FreeTextEntry3] : All medical record entries made by the Scribe were at my,  Dr. Sami Aburto MD., direction and personally dictated by me on 04/01/2021. I have personally reviewed the chart and agree that the record accurately reflects my personal performance of the history, physical exam, assessment and plan.\par \par

## 2021-04-02 NOTE — DISCUSSION/SUMMARY
[de-identified] : Advised to continue with PT. \par Advised regarding weight bearing exercises as tolerated.\par Range of motion and strengthening exercises were reviewed. \par Patient is advised to use a hot shower to stretch. \par NSAIDs as tolerated. \par \par Patient can continue activities as tolerated. All questions answered, understanding verbalized. Patient in agreement with plan of care. \par \par Follow up in 3 months.

## 2021-07-26 ENCOUNTER — APPOINTMENT (OUTPATIENT)
Dept: ORTHOPEDIC SURGERY | Facility: CLINIC | Age: 71
End: 2021-07-26
Payer: MEDICARE

## 2021-07-26 DIAGNOSIS — S42.231A 3-PART FRACTURE OF SURGICAL NECK OF RIGHT HUMERUS, INITIAL ENCOUNTER FOR CLOSED FRACTURE: ICD-10-CM

## 2021-07-26 PROCEDURE — 73030 X-RAY EXAM OF SHOULDER: CPT | Mod: RT

## 2021-07-26 PROCEDURE — 99213 OFFICE O/P EST LOW 20 MIN: CPT

## 2021-07-26 NOTE — ADDENDUM
[FreeTextEntry1] : I, Lucia Bourgeois wrote this note acting as a scribe for Dr. Sami Aburto on Jul 26, 2021.\par \par

## 2021-07-26 NOTE — PHYSICAL EXAM
[de-identified] : Patient is WDWN, alert, and in no acute distress. Breathing is unlabored. She is grossly oriented to person, place, and time.\par \par Right Shoulder:\par No sign of infection\par Keloid present along incision site.\par ROM: Active Flexion 120, Abduction 90\par PROM, flexion: 160 [de-identified] : AP, lateral and oblique views of the right humerus and shoulder were obtained and revealed displaced comminuted proximal humerus fracture stabilized with a Synthes proximal humerus plate and allograft strut graft. Fracture is healed. No change in hardware position.

## 2021-07-26 NOTE — END OF VISIT
[FreeTextEntry3] : All medical record entries made by the Scribe were at my,  Dr. Sami Aburto MD., direction and personally dictated by me on 07/26/2021. I have personally reviewed the chart and agree that the record accurately reflects my personal performance of the history, physical exam, assessment and plan.\par \par

## 2021-07-26 NOTE — DISCUSSION/SUMMARY
[de-identified] : The underlying pathophysiology was reviewed with the patient. XR films were reviewed with the patient. Discussed at length the nature of the patient’s condition. \par \par Patient was advised to continue with at-home exercises program as she will continue to regain ROM.\par ROM, stretching and strengthening exercises were reviewed.\par She should continue with PT for the remainder of her approved sessions.\par Topical analgesics as needed.\par NSAIDs prn.\par \par Patient can continue activities as tolerated. All questions answered, understanding verbalized. Patient in agreement with plan of care. Follow up in 3 months.

## 2021-07-26 NOTE — HISTORY OF PRESENT ILLNESS
[de-identified] : CK MONTANEZ is a LHD 71 year female presents for follow-up evaluation of ORIF right proximal humerus with fibular allograft strut graft and reinforcement with rotator cuff repair at [facility]. The surgery was performed on 12/04/2020. Patient presents on 7/26/21 for repeat XRs. She reports that she has 4 more sessions of PT approved. She would like to discuss with her therapist about trying self-pay so she can continue with sessions as she feels they are very helpful. On her own, she states she has an at-home exercise program that she performs. She states he is concerned about potential weakening of the humerus. She has had a bone density test in the past that revealed osteopenia.

## 2021-11-11 ENCOUNTER — APPOINTMENT (OUTPATIENT)
Dept: ORTHOPEDIC SURGERY | Facility: CLINIC | Age: 71
End: 2021-11-11
Payer: MEDICARE

## 2021-11-11 PROCEDURE — 73030 X-RAY EXAM OF SHOULDER: CPT | Mod: RT

## 2021-11-11 PROCEDURE — 99213 OFFICE O/P EST LOW 20 MIN: CPT

## 2021-11-11 NOTE — PHYSICAL EXAM
[de-identified] : Patient is WDWN, alert, and in no acute distress. Breathing is unlabored. She is grossly oriented to person, place, and time.\par \par Right Shoulder:\par No sign of infection\par Keloid present along incision site.\par ROM: Active Flexion 120, Abduction 90\par PROM, flexion: 160  [de-identified] : AP, lateral and oblique views of the right humerus and shoulder were obtained and revealed displaced comminuted proximal humerus fracture stabilized with a Synthes proximal humerus plate and allograft strut graft. Fracture is healed. No change in hardware position.

## 2021-11-11 NOTE — END OF VISIT
[FreeTextEntry3] : All medical record entries made by the Scribe were at my,  Dr. Sami Aburto MD., direction and personally dictated by me on 11/11/2021. I have personally reviewed the chart and agree that the record accurately reflects my personal performance of the history, physical exam, assessment and plan.

## 2021-11-11 NOTE — DISCUSSION/SUMMARY
[de-identified] : The underlying pathophysiology was reviewed with the patient. XR films were reviewed with the patient. Discussed at length the nature of the patient’s condition. \par \par I recommended she utilize the upper extremity for ADLs as tolerated. \par She was advised to continue with ROM exercises both actively and passively.\par She was instructed on ROM exercises to perform while in a hot shower.\par She may continue with PT.\par \par Regarding the left knee, we did discuss the removal of hardware as she notes discomfort that has been increasing with time to the knee. She was provided with the information of our surgical schedular and will call the office to schedule the removal when it is convenient for her.\par \par All questions answered, understanding verbalized. Patient in agreement with plan of care. Follow up as needed.

## 2021-11-11 NOTE — ADDENDUM
[FreeTextEntry1] : I, Lucia Bourgeois wrote this note acting as a scribe for Dr. Sami Aburto on Nov 11, 2021.

## 2021-11-11 NOTE — REASON FOR VISIT
[Follow-Up Visit] : a follow-up visit for [FreeTextEntry2] : s/p ORIF right proximal humerus with fibular allograft strut graft and reinforcement with rotator cuff repair

## 2021-11-11 NOTE — HISTORY OF PRESENT ILLNESS
[de-identified] : CK MONTANEZ is a LHD 71 year female presents for follow-up evaluation of ORIF right proximal humerus with fibular allograft strut graft and reinforcement with rotator cuff repair at [facility]. The surgery was performed on 12/04/2020. She returns on 11/11/21 noting she is happy with her progress thus far. She notes improvements with motion of overhead activities as well as internal rotation. She has used the arm more for ADLs without much difficulty.  She has continued with PT of the right shoulder and notes she will be finishing up her visits shortly.\par \par She is status post ORIF of the left tibial plateau in 2006. She states the knee has become bothersome as of late and she notices the presence of hardware about the knee. She reports tightness to the hamstring and Achilles tendon which has been present since the surgery. She complains of residual swelling and cracking to the knee.

## 2022-02-08 DIAGNOSIS — Z91.89 OTHER SPECIFIED PERSONAL RISK FACTORS, NOT ELSEWHERE CLASSIFIED: ICD-10-CM

## 2022-03-22 ENCOUNTER — APPOINTMENT (OUTPATIENT)
Dept: INTERNAL MEDICINE | Facility: CLINIC | Age: 72
End: 2022-03-22
Payer: MEDICARE

## 2022-03-22 ENCOUNTER — NON-APPOINTMENT (OUTPATIENT)
Age: 72
End: 2022-03-22

## 2022-03-22 VITALS
DIASTOLIC BLOOD PRESSURE: 80 MMHG | HEIGHT: 61 IN | TEMPERATURE: 97.8 F | WEIGHT: 140 LBS | BODY MASS INDEX: 26.43 KG/M2 | SYSTOLIC BLOOD PRESSURE: 120 MMHG

## 2022-03-22 DIAGNOSIS — R73.09 OTHER ABNORMAL GLUCOSE: ICD-10-CM

## 2022-03-22 DIAGNOSIS — Z78.0 ASYMPTOMATIC MENOPAUSAL STATE: ICD-10-CM

## 2022-03-22 PROCEDURE — 99213 OFFICE O/P EST LOW 20 MIN: CPT | Mod: 25

## 2022-03-22 PROCEDURE — 36415 COLL VENOUS BLD VENIPUNCTURE: CPT

## 2022-03-22 PROCEDURE — G0439: CPT

## 2022-03-22 PROCEDURE — 93000 ELECTROCARDIOGRAM COMPLETE: CPT

## 2022-03-22 NOTE — HEALTH RISK ASSESSMENT
[Very Good] : ~his/her~  mood as very good [Good] : ~his/her~  mood as  good [Never] : Never [Yes] : Yes [No] : In the past 12 months have you used drugs other than those required for medical reasons? No [No falls in past year] : Patient reported no falls in the past year [0] : 2) Feeling down, depressed, or hopeless: Not at all (0) [Patient reported mammogram was normal] : Patient reported mammogram was normal [Patient declined PAP Smear] : Patient declined PAP Smear [Patient reported bone density results were abnormal] : Patient reported bone density results were abnormal [Patient reported colonoscopy was normal] : Patient reported colonoscopy was normal [Retired] : retired [] :  [# Of Children ___] : has [unfilled] children [Smoke Detector] : smoke detector [Carbon Monoxide Detector] : carbon monoxide detector [Seat Belt] :  uses seat belt [Sunscreen] : uses sunscreen [de-identified] : occ [de-identified] : yoga [de-identified] : healthy [[id number]] : 0 [EyeExamDate] : 2021 [Change in mental status noted] : No change in mental status noted [Reports changes in hearing] : Reports no changes in hearing [Reports changes in vision] : Reports no changes in vision [Reports changes in dental health] : Reports no changes in dental health [TB Exposure] : is not being exposed to tuberculosis [MammogramDate] : 2022 [BoneDensityDate] : 2022 [ColonoscopyDate] : 2016 [FreeTextEntry2] : part time

## 2022-03-22 NOTE — HEALTH RISK ASSESSMENT
[Very Good] : ~his/her~  mood as very good [Good] : ~his/her~  mood as  good [Never] : Never [Yes] : Yes [No] : In the past 12 months have you used drugs other than those required for medical reasons? No [No falls in past year] : Patient reported no falls in the past year [0] : 2) Feeling down, depressed, or hopeless: Not at all (0) [Patient reported mammogram was normal] : Patient reported mammogram was normal [Patient declined PAP Smear] : Patient declined PAP Smear [Patient reported bone density results were abnormal] : Patient reported bone density results were abnormal [Patient reported colonoscopy was normal] : Patient reported colonoscopy was normal [Retired] : retired [] :  [# Of Children ___] : has [unfilled] children [Smoke Detector] : smoke detector [Carbon Monoxide Detector] : carbon monoxide detector [Seat Belt] :  uses seat belt [Sunscreen] : uses sunscreen [de-identified] : occ [de-identified] : yoga [de-identified] : healthy [[id number]] : 0 [EyeExamDate] : 2021 [Change in mental status noted] : No change in mental status noted [Reports changes in hearing] : Reports no changes in hearing [Reports changes in vision] : Reports no changes in vision [Reports changes in dental health] : Reports no changes in dental health [TB Exposure] : is not being exposed to tuberculosis [MammogramDate] : 2022 [BoneDensityDate] : 2022 [ColonoscopyDate] : 2016 [FreeTextEntry2] : part time

## 2022-03-25 LAB
25(OH)D3 SERPL-MCNC: 55.4 NG/ML
ALBUMIN SERPL ELPH-MCNC: 4.3 G/DL
ALP BLD-CCNC: 53 U/L
ALT SERPL-CCNC: 15 U/L
ANION GAP SERPL CALC-SCNC: 9 MMOL/L
AST SERPL-CCNC: 23 U/L
BASOPHILS # BLD AUTO: 0.08 K/UL
BASOPHILS NFR BLD AUTO: 1.1 %
BILIRUB SERPL-MCNC: 0.4 MG/DL
BUN SERPL-MCNC: 13 MG/DL
CALCIUM SERPL-MCNC: 9.5 MG/DL
CHLORIDE SERPL-SCNC: 105 MMOL/L
CHOLEST SERPL-MCNC: 196 MG/DL
CO2 SERPL-SCNC: 27 MMOL/L
CREAT SERPL-MCNC: 0.56 MG/DL
EGFR: 98 ML/MIN/1.73M2
EOSINOPHIL # BLD AUTO: 0.1 K/UL
EOSINOPHIL NFR BLD AUTO: 1.4 %
ESTIMATED AVERAGE GLUCOSE: 103 MG/DL
GLUCOSE SERPL-MCNC: 84 MG/DL
HBA1C MFR BLD HPLC: 5.2 %
HCT VFR BLD CALC: 44.3 %
HDLC SERPL-MCNC: 58 MG/DL
HGB BLD-MCNC: 14.1 G/DL
IMM GRANULOCYTES NFR BLD AUTO: 0.3 %
LDLC SERPL CALC-MCNC: 126 MG/DL
LYMPHOCYTES # BLD AUTO: 1.62 K/UL
LYMPHOCYTES NFR BLD AUTO: 22.5 %
MAN DIFF?: NORMAL
MCHC RBC-ENTMCNC: 30.3 PG
MCHC RBC-ENTMCNC: 31.8 GM/DL
MCV RBC AUTO: 95.3 FL
MONOCYTES # BLD AUTO: 0.67 K/UL
MONOCYTES NFR BLD AUTO: 9.3 %
NEUTROPHILS # BLD AUTO: 4.72 K/UL
NEUTROPHILS NFR BLD AUTO: 65.4 %
NONHDLC SERPL-MCNC: 138 MG/DL
PLATELET # BLD AUTO: 254 K/UL
POTASSIUM SERPL-SCNC: 4 MMOL/L
PROT SERPL-MCNC: 7.3 G/DL
RBC # BLD: 4.65 M/UL
RBC # FLD: 13 %
SODIUM SERPL-SCNC: 141 MMOL/L
TRIGL SERPL-MCNC: 61 MG/DL
WBC # FLD AUTO: 7.21 K/UL

## 2022-04-13 ENCOUNTER — APPOINTMENT (OUTPATIENT)
Dept: INTERNAL MEDICINE | Facility: CLINIC | Age: 72
End: 2022-04-13

## 2022-05-05 ENCOUNTER — RESULT REVIEW (OUTPATIENT)
Age: 72
End: 2022-05-05

## 2022-06-16 ENCOUNTER — APPOINTMENT (OUTPATIENT)
Dept: CARDIOLOGY | Facility: CLINIC | Age: 72
End: 2022-06-16
Payer: MEDICARE

## 2022-06-16 VITALS
BODY MASS INDEX: 27 KG/M2 | HEART RATE: 81 BPM | DIASTOLIC BLOOD PRESSURE: 80 MMHG | SYSTOLIC BLOOD PRESSURE: 131 MMHG | WEIGHT: 143 LBS | OXYGEN SATURATION: 98 % | HEIGHT: 61 IN

## 2022-06-16 DIAGNOSIS — R60.0 LOCALIZED EDEMA: ICD-10-CM

## 2022-06-16 PROCEDURE — 99204 OFFICE O/P NEW MOD 45 MIN: CPT

## 2022-06-16 NOTE — HISTORY OF PRESENT ILLNESS
[FreeTextEntry1] : 72-year-old female self-referred for a "cardiac checkup".  She has no prior history of heart disease and is in good general health.  She has a long history of palpitations and has been noted to have VPCs on prior EKGs.  She had a nuclear stress test in 10/19 which was within normal limits.\par \par She continues to note palpitations, but has no chest pressure or shortness of breath and believes her exercise tolerance is normal.  She has had left knee surgery and has some swelling of her leg below the knee ever since.  She "feels the blood" going into her leg.\par \par She is mildly hypercholesterolemic with an LDL of 126 on her most recent blood work.  She is on no medication

## 2022-06-16 NOTE — REVIEW OF SYSTEMS
[Palpitations] : palpitations [Joint Pain] : joint pain [Joint Stiffness] : joint stiffness [Negative] : Heme/Lymph

## 2022-06-16 NOTE — DISCUSSION/SUMMARY
[FreeTextEntry1] : Ms Andrea is a healthy 72-year-old female with a long history of palpitations which are unchanged.  Her exam shows regular rhythm, normal blood pressure, clear lungs, and a normal cardiac exam, there is mild edema of her left leg.  The pulses are normal in both legs and her feet are warm.  An EKG done in March is within normal limits.\par \par There is no evidence of any significant cardiac problem and she is already been worked up for palpitations with a normal stress test in the past.  I do not think anything further is indicated at this time.\par \par I reassured her that I did not see any evidence of any significant heart disease.  She will follow-up on a as needed basis.

## 2022-07-19 ENCOUNTER — APPOINTMENT (OUTPATIENT)
Dept: UROGYNECOLOGY | Facility: CLINIC | Age: 72
End: 2022-07-19

## 2022-07-19 VITALS
SYSTOLIC BLOOD PRESSURE: 135 MMHG | HEART RATE: 68 BPM | WEIGHT: 142 LBS | OXYGEN SATURATION: 96 % | DIASTOLIC BLOOD PRESSURE: 82 MMHG | TEMPERATURE: 97.1 F | HEIGHT: 60 IN | BODY MASS INDEX: 27.88 KG/M2

## 2022-07-19 DIAGNOSIS — Z87.898 PERSONAL HISTORY OF OTHER SPECIFIED CONDITIONS: ICD-10-CM

## 2022-07-19 LAB
BILIRUB UR QL STRIP: NORMAL
CLARITY UR: CLEAR
COLLECTION METHOD: NORMAL
GLUCOSE UR-MCNC: NORMAL
HCG UR QL: 0.2 EU/DL
HGB UR QL STRIP.AUTO: NORMAL
KETONES UR-MCNC: NORMAL
LEUKOCYTE ESTERASE UR QL STRIP: NORMAL
NITRITE UR QL STRIP: NORMAL
PH UR STRIP: 6.5
PROT UR STRIP-MCNC: NORMAL
SP GR UR STRIP: 1

## 2022-07-19 PROCEDURE — 51701 INSERT BLADDER CATHETER: CPT

## 2022-07-19 PROCEDURE — 99204 OFFICE O/P NEW MOD 45 MIN: CPT | Mod: 25

## 2022-07-19 NOTE — DISCUSSION/SUMMARY
[FreeTextEntry1] : \par 1. prolapse\par - We reviewed management options for her prolapse including: observation, pelvic floor exercises with and without PT, pessary, and surgical management. She would like to try pelvic floor PT, Rx provided with contact information for local providers. Written instructions on how to perform the exercises were also provided to her. \par \par 2. Urinary frequency, urgency, UUI\par -Start behavioral and fluid modifications \par -Start bladder training\par -If no improvement in symptoms after 6-8 weeks, will consider a trial of medication for OAB\par \par 3. fecal incontinence:\par -Start stool bulking agent, FiberCon or Citrucel daily\par -Start align daily\par -Loperamide 2mg prn\par -GI consult for colonoscopy and further evaluation\par \par RTO in 6-8 wks for follow up, or sooner if issues arise\par \par The following treatment plan was designed for this patient and provided to her in written form and reviewed extensively. Patient was given a copy to take home: \par 1.	Prolapse: \par -Physical therapy\par -Pelvic floor exercises at home\par \par 2.	Urinary frequency, urgency, incontinence:\par -Modify fluid intake:\par **Total fluids: 34-50 oz (1-1.5 Liters) per day\par   -Ex. 8 oz tea (even if decaf!), 2-3 bottles of water (Each bottle is 16.9 oz). No flavoring added. No seltzer or Pelligrino!\par  -Drink slowly throughout day, no binge drinking (each bottle of water should take you hours, not minutes)\par **Avoid: coffee, iced tea, alcohol, carbonation (soda, seltzer), spicy foods, citrus, artificial sweeteners, chocolate\par **Stop eating and drinking 2-3 hours before bedtime (sips ok)\par \par -Try the above fluid changes and bladder training (instructions provided) for 6 weeks. If symptoms still remain bothersome, will consider a trial of medication for overactive bladder \par \par 3.	Stool leakage:\par -Start FiberCon or Citrucel daily\par -Start Align daily\par -Can take Immodium 2mg as needed \par -See GI for colonoscopy and evaluation\par \par IUGA info on BT and OAB provided as well\par

## 2022-07-19 NOTE — PHYSICAL EXAM
[Chaperone Present] : A chaperone was present in the examining room during all aspects of the physical examination [Labia Majora] : were normal [Labia Minora] : were normal [Normal Appearance] : general appearance was normal [Atrophy] : atrophy [Aa ____] : Aa [unfilled] [Ba ____] : Ba [unfilled] [C ____] : C [unfilled] [GH ____] : GH [unfilled] [PB ____] : PB [unfilled] [TVL ____] : TVL  [unfilled] [Ap ____] : Ap [unfilled] [Bp ____] : Bp [unfilled] [D ____] : D [unfilled] [Normal] : no abnormalities [Exam Deferred] : was deferred [FreeTextEntry1] : General: Well appearing. Alert and oriented. No acute distress. \par HEENT: Normocephalic, atraumatic, extraocular muscles appear to be intact. \par Neck: Full range of motion, no obvious lymphadenopathy, deformities, or masses noted.\par Respiratory: Speaking in full sentences comfortably. Normal work of breathing. No cough during visit. \par Musculoskeletal: Active full range of motion in extremities. \par Skin: No obvious rash or skin lesions. \par Neuro: Oriented x3. Speech is fluent, normal rate. \par Psych: Normal mood and affect. \par \par  [Tenderness] : ~T no ~M abdominal tenderness observed [Distended] : not distended

## 2022-07-19 NOTE — REASON FOR VISIT
[Questionnaire Received] : Patient questionnaire received [Intake Form Reviewed] : Patient intake form with past medical history, surgical history, family history and social history reviewed today [Urine Frequency] : urine frequency [Urinary Incontinence] : urinary incontinence [Urinary Urgency] : urinary urgency [Pelvic Organ Prolapse] : pelvic organ prolapse

## 2022-07-19 NOTE — REVIEW OF SYSTEMS
Pt having echo and stress test today 2/17  [All Other ROS] : all other reviewed systems are negative

## 2022-07-20 LAB
APPEARANCE: CLEAR
BACTERIA: NEGATIVE
BILIRUBIN URINE: NEGATIVE
BLOOD URINE: NEGATIVE
COLOR: COLORLESS
GLUCOSE QUALITATIVE U: NEGATIVE
HYALINE CASTS: 1 /LPF
KETONES URINE: NEGATIVE
LEUKOCYTE ESTERASE URINE: NEGATIVE
MICROSCOPIC-UA: NORMAL
NITRITE URINE: NEGATIVE
PH URINE: 6.5
PROTEIN URINE: NEGATIVE
RED BLOOD CELLS URINE: 0 /HPF
SPECIFIC GRAVITY URINE: 1
SQUAMOUS EPITHELIAL CELLS: 1 /HPF
UROBILINOGEN URINE: NORMAL
WHITE BLOOD CELLS URINE: 0 /HPF

## 2022-07-21 LAB — BACTERIA UR CULT: NORMAL

## 2022-08-12 ENCOUNTER — TRANSCRIPTION ENCOUNTER (OUTPATIENT)
Age: 72
End: 2022-08-12

## 2022-08-12 ENCOUNTER — INPATIENT (INPATIENT)
Facility: HOSPITAL | Age: 72
LOS: 2 days | Discharge: HOME | DRG: 419 | End: 2022-08-15
Attending: SURGERY | Admitting: SURGERY
Payer: COMMERCIAL

## 2022-08-12 VITALS
DIASTOLIC BLOOD PRESSURE: 98 MMHG | WEIGHT: 145.06 LBS | OXYGEN SATURATION: 98 % | HEIGHT: 60 IN | HEART RATE: 73 BPM | TEMPERATURE: 98 F | RESPIRATION RATE: 20 BRPM | SYSTOLIC BLOOD PRESSURE: 156 MMHG

## 2022-08-12 DIAGNOSIS — Z98.51 TUBAL LIGATION STATUS: Chronic | ICD-10-CM

## 2022-08-12 DIAGNOSIS — Z98.890 OTHER SPECIFIED POSTPROCEDURAL STATES: Chronic | ICD-10-CM

## 2022-08-12 LAB
ALBUMIN SERPL ELPH-MCNC: 4.4 G/DL — SIGNIFICANT CHANGE UP (ref 3.3–5)
ALP SERPL-CCNC: 69 U/L — SIGNIFICANT CHANGE UP (ref 40–120)
ALT FLD-CCNC: 15 U/L — SIGNIFICANT CHANGE UP (ref 10–45)
ANION GAP SERPL CALC-SCNC: 15 MMOL/L — SIGNIFICANT CHANGE UP (ref 5–17)
APTT BLD: 25.5 SEC — LOW (ref 27.5–35.5)
AST SERPL-CCNC: 21 U/L — SIGNIFICANT CHANGE UP (ref 10–40)
BASOPHILS # BLD AUTO: 0.03 K/UL — SIGNIFICANT CHANGE UP (ref 0–0.2)
BASOPHILS NFR BLD AUTO: 0.2 % — SIGNIFICANT CHANGE UP (ref 0–2)
BILIRUB SERPL-MCNC: 0.4 MG/DL — SIGNIFICANT CHANGE UP (ref 0.2–1.2)
BLD GP AB SCN SERPL QL: NEGATIVE — SIGNIFICANT CHANGE UP
BUN SERPL-MCNC: 12 MG/DL — SIGNIFICANT CHANGE UP (ref 7–23)
CALCIUM SERPL-MCNC: 9.1 MG/DL — SIGNIFICANT CHANGE UP (ref 8.4–10.5)
CHLORIDE SERPL-SCNC: 99 MMOL/L — SIGNIFICANT CHANGE UP (ref 96–108)
CO2 SERPL-SCNC: 24 MMOL/L — SIGNIFICANT CHANGE UP (ref 22–31)
CREAT SERPL-MCNC: 0.55 MG/DL — SIGNIFICANT CHANGE UP (ref 0.5–1.3)
EGFR: 98 ML/MIN/1.73M2 — SIGNIFICANT CHANGE UP
EOSINOPHIL # BLD AUTO: 0 K/UL — SIGNIFICANT CHANGE UP (ref 0–0.5)
EOSINOPHIL NFR BLD AUTO: 0 % — SIGNIFICANT CHANGE UP (ref 0–6)
GLUCOSE SERPL-MCNC: 112 MG/DL — HIGH (ref 70–99)
HCT VFR BLD CALC: 43.5 % — SIGNIFICANT CHANGE UP (ref 34.5–45)
HGB BLD-MCNC: 14.4 G/DL — SIGNIFICANT CHANGE UP (ref 11.5–15.5)
IMM GRANULOCYTES NFR BLD AUTO: 0.5 % — SIGNIFICANT CHANGE UP (ref 0–1.5)
INR BLD: 1.13 RATIO — SIGNIFICANT CHANGE UP (ref 0.88–1.16)
LIDOCAIN IGE QN: 16 U/L — SIGNIFICANT CHANGE UP (ref 7–60)
LYMPHOCYTES # BLD AUTO: 0.97 K/UL — LOW (ref 1–3.3)
LYMPHOCYTES # BLD AUTO: 6.5 % — LOW (ref 13–44)
MAGNESIUM SERPL-MCNC: 2 MG/DL — SIGNIFICANT CHANGE UP (ref 1.6–2.6)
MCHC RBC-ENTMCNC: 29.7 PG — SIGNIFICANT CHANGE UP (ref 27–34)
MCHC RBC-ENTMCNC: 33.1 GM/DL — SIGNIFICANT CHANGE UP (ref 32–36)
MCV RBC AUTO: 89.7 FL — SIGNIFICANT CHANGE UP (ref 80–100)
MONOCYTES # BLD AUTO: 0.94 K/UL — HIGH (ref 0–0.9)
MONOCYTES NFR BLD AUTO: 6.3 % — SIGNIFICANT CHANGE UP (ref 2–14)
NEUTROPHILS # BLD AUTO: 12.88 K/UL — HIGH (ref 1.8–7.4)
NEUTROPHILS NFR BLD AUTO: 86.5 % — HIGH (ref 43–77)
NRBC # BLD: 0 /100 WBCS — SIGNIFICANT CHANGE UP (ref 0–0)
PLATELET # BLD AUTO: 227 K/UL — SIGNIFICANT CHANGE UP (ref 150–400)
POTASSIUM SERPL-MCNC: 4.3 MMOL/L — SIGNIFICANT CHANGE UP (ref 3.5–5.3)
POTASSIUM SERPL-SCNC: 4.3 MMOL/L — SIGNIFICANT CHANGE UP (ref 3.5–5.3)
PROT SERPL-MCNC: 8 G/DL — SIGNIFICANT CHANGE UP (ref 6–8.3)
PROTHROM AB SERPL-ACNC: 13 SEC — SIGNIFICANT CHANGE UP (ref 10.5–13.4)
RBC # BLD: 4.85 M/UL — SIGNIFICANT CHANGE UP (ref 3.8–5.2)
RBC # FLD: 12.6 % — SIGNIFICANT CHANGE UP (ref 10.3–14.5)
RH IG SCN BLD-IMP: POSITIVE — SIGNIFICANT CHANGE UP
SARS-COV-2 RNA SPEC QL NAA+PROBE: SIGNIFICANT CHANGE UP
SODIUM SERPL-SCNC: 138 MMOL/L — SIGNIFICANT CHANGE UP (ref 135–145)
TROPONIN T, HIGH SENSITIVITY RESULT: 6 NG/L — SIGNIFICANT CHANGE UP (ref 0–51)
TROPONIN T, HIGH SENSITIVITY RESULT: 6 NG/L — SIGNIFICANT CHANGE UP (ref 0–51)
WBC # BLD: 14.89 K/UL — HIGH (ref 3.8–10.5)
WBC # FLD AUTO: 14.89 K/UL — HIGH (ref 3.8–10.5)

## 2022-08-12 PROCEDURE — 99220: CPT

## 2022-08-12 PROCEDURE — 76705 ECHO EXAM OF ABDOMEN: CPT | Mod: 26

## 2022-08-12 PROCEDURE — 78227 HEPATOBIL SYST IMAGE W/DRUG: CPT | Mod: 26,MA

## 2022-08-12 RX ORDER — SODIUM CHLORIDE 9 MG/ML
1000 INJECTION INTRAMUSCULAR; INTRAVENOUS; SUBCUTANEOUS ONCE
Refills: 0 | Status: COMPLETED | OUTPATIENT
Start: 2022-08-12 | End: 2022-08-12

## 2022-08-12 RX ORDER — FAMOTIDINE 10 MG/ML
20 INJECTION INTRAVENOUS ONCE
Refills: 0 | Status: COMPLETED | OUTPATIENT
Start: 2022-08-12 | End: 2022-08-12

## 2022-08-12 RX ORDER — ACETAMINOPHEN 500 MG
1000 TABLET ORAL ONCE
Refills: 0 | Status: COMPLETED | OUTPATIENT
Start: 2022-08-12 | End: 2022-08-12

## 2022-08-12 RX ORDER — ONDANSETRON 8 MG/1
4 TABLET, FILM COATED ORAL ONCE
Refills: 0 | Status: COMPLETED | OUTPATIENT
Start: 2022-08-12 | End: 2022-08-12

## 2022-08-12 RX ORDER — SODIUM CHLORIDE 9 MG/ML
1000 INJECTION INTRAMUSCULAR; INTRAVENOUS; SUBCUTANEOUS
Refills: 0 | Status: DISCONTINUED | OUTPATIENT
Start: 2022-08-12 | End: 2022-08-13

## 2022-08-12 RX ADMIN — SODIUM CHLORIDE 1000 MILLILITER(S): 9 INJECTION INTRAMUSCULAR; INTRAVENOUS; SUBCUTANEOUS at 12:21

## 2022-08-12 RX ADMIN — SODIUM CHLORIDE 125 MILLILITER(S): 9 INJECTION INTRAMUSCULAR; INTRAVENOUS; SUBCUTANEOUS at 23:04

## 2022-08-12 RX ADMIN — Medication 400 MILLIGRAM(S): at 12:20

## 2022-08-12 RX ADMIN — ONDANSETRON 4 MILLIGRAM(S): 8 TABLET, FILM COATED ORAL at 12:21

## 2022-08-12 RX ADMIN — FAMOTIDINE 20 MILLIGRAM(S): 10 INJECTION INTRAVENOUS at 12:22

## 2022-08-12 RX ADMIN — Medication 30 MILLILITER(S): at 12:22

## 2022-08-12 RX ADMIN — Medication 400 MILLIGRAM(S): at 23:27

## 2022-08-12 RX ADMIN — Medication 1000 MILLIGRAM(S): at 23:42

## 2022-08-12 RX ADMIN — ONDANSETRON 4 MILLIGRAM(S): 8 TABLET, FILM COATED ORAL at 14:53

## 2022-08-12 NOTE — ED PROVIDER NOTE - PROGRESS NOTE DETAILS
Saravanan Barbosa M.D. (PGY-2): Pt continues to be symptomatic. serology w/ leukocytosis. US not called as acute balbir, though associated findings were noted, surgery saw patient, recommends HIDA scan for definitive acute balbir r/out. will place in CDU. if negative, likely d/c on PPI w/ outpatient GI follow up for dyspepsia.  plan endorsed by CDU PA.

## 2022-08-12 NOTE — ED CDU PROVIDER INITIAL DAY NOTE - NSCAREINITIATED _GEN_ER
Improving  Continue with hemodialysis and ultrafiltration per Nephrology recommendation Saravanan Barbosa(Resident)

## 2022-08-12 NOTE — ED ADULT TRIAGE NOTE - HAVE YOU HAD A FIRST COVID-19 BOOSTER?
Telephone Encounter by Ceci Jang at 08/18/18 11:51 AM     Author:  Ceci Jang Service:  (none) Author Type:  Certified Nursing Assistant     Filed:  08/18/18 11:52 AM Encounter Date:  8/16/2018 Status:  Signed     :  Ceci Jang (Certified Nursing Assistant)            Patient scheduled 10/25/2018 with Dr. Garcias[MM1.1M]    Routed to provider's clinical pool.  Message confirmed with caller.[MM1.1T]        Revision History        User Key Date/Time User Provider Type Action    > MM1.1 08/18/18 11:52 AM Ceci Jang Certified Nursing Assistant Sign    M - Manual, T - Template             Yes

## 2022-08-12 NOTE — ED PROVIDER NOTE - NS ED ROS FT
GENERAL: no fever  EYES: no eye pain  HEENT: no neck pain  CARDIAC: no chest pain  PULMONARY: no SOB  GI: + epigastric pain  : no dysuria, no vaginal bleeding  SKIN: no rashes  NEURO: no headache  MSK: no new joint pain

## 2022-08-12 NOTE — ED PROVIDER NOTE - OBJECTIVE STATEMENT
73 yo F, no pmhx, presenting w/ epigastric pain x2 days. Pt ate a large quantity of protein shake prior to onset of symptoms. She reports similar symptoms in the past 2/2 gall bladder problems. Her pain is epigastric w/ associated n/v and upper back pain. No fevers, chills, shortness of breath, weakness, numbness. Last BM was this AM. Surgical hx = tubal ligation. no recent surgery. nkda. no regular medications.

## 2022-08-12 NOTE — ED CDU PROVIDER DISPOSITION NOTE - NSFOLLOWUPINSTRUCTIONS_ED_ALL_ED_FT
Stay well hydrated. Eat a bland diet. Avoid fatty/fried/spicy/tomato-based/acidic foods. Avoid alcohol and caffeine intake. Advance diet as tolerated and eat small frequent meals.     Avoid tight-fitting clothes around your waist.   Avoid eating before bed. Sleep with your head slightly elevated.     Take Pepcid 20mg 30 minutes before meals 2x/day.  Take over the counter Maalox 2-3 times daily as needed.       Follow up with your primary care provider upon discharge.  Follow up with Gastroenterology this week for further evaluation and outpatient endoscopy. Bring copy of your printed results with you.     Return to ER for fever, new/worsening abdominal pain, vomiting, persistent or bloody diarrhea, inability to tolerate food/fluid, chest pain, shortness of breath, or any other concerning symptoms.

## 2022-08-12 NOTE — ED CDU PROVIDER INITIAL DAY NOTE - OBJECTIVE STATEMENT
71 yo F, no pmhx, presenting w/ epigastric pain x2 days. Pt ate a large quantity of protein shake prior to onset of symptoms. She reports similar symptoms in the past 2/2 gall bladder problems. Her pain is epigastric w/ associated n/v and upper back pain. No fevers, chills, shortness of breath, weakness, numbness. Last BM was this AM. Denies BRBPR or melena. Surgical hx = tubal ligation. no recent surgery. nkda. no regular medications. Last ate yesterday.  In ED, most recent VSS. Labs + WBC 14.9. RUQ US + gallstones and mild wall thickening. Surgery consulted recommending HIDA scan. Surgery also recommending FOB, however per ED provider not indicated and pt may f/u outpatient with GI.

## 2022-08-12 NOTE — ED ADULT NURSE REASSESSMENT NOTE - NS ED NURSE REASSESS COMMENT FT1
Received pt from CLAUDY Marsh, received pt alert and responsive, oriented x4, denies any respiratory distress, SOB, or difficulty breathing. Pt transferred to CDU for epigastric/ RUQ abdominal pain. HIDA performed pending results. IV in place, patent and free of signs of infiltration, V/S stable, pt afebrile, pt denies pain at this time. Pt educated on unit and unit rules, instructed patient to notify RN of any needed assistance, Pt verbalizes understanding, Call bell placed within reach. Safety maintained. Will continue to monitor. Family member at bedside. Received pt from CLAUDY Marsh, received pt alert and responsive, oriented x4, denies any respiratory distress, SOB, or difficulty breathing. Pt transferred to CDU for epigastric/ RUQ abdominal pain currently 10/10, will medicate per order. HIDA performed pending results. IV in place, patent and free of signs of infiltration, V/S stable, pt afebrile. Pt educated on unit and unit rules, instructed patient to notify RN of any needed assistance, Pt verbalizes understanding, Call bell placed within reach. Safety maintained. Will continue to monitor. Family member at bedside.

## 2022-08-12 NOTE — ED CDU PROVIDER INITIAL DAY NOTE - PROGRESS NOTE DETAILS
Call from radiology Dr. Mac, ALY +acute cholecystitis. Surgery paged.   Patient with RUQ pain, with pain with palpation. - Janine Mccoy PA-C

## 2022-08-12 NOTE — ED ADULT NURSE NOTE - SUICIDE SCREENING DEPRESSION
[FreeTextEntry1] : I, Jeb Torre, acted solely as a scribe for Dr. Perez Russo on this date 11/06/2019. 
Negative

## 2022-08-12 NOTE — ED CDU PROVIDER DISPOSITION NOTE - NS ED ATTENDING STATEMENT MOD
This was a shared visit with the STEFFANY. I reviewed and verified the documentation and independently performed the documented:

## 2022-08-12 NOTE — CONSULT NOTE ADULT - ATTENDING COMMENTS
seen and examined 08-12-22 @ 1710    1981 - laparoscopic bilateral tubal ligation    recurrent postprandial RUQ pain for many years  now has 1 day of worsening epigastric pain which has migrated to the RUQ, associated with nausea and vomiting.    afeb  AVSS  soft / mild RUQ tenderness / ND  infraumbilical trocar scar  no jaundice      WBC = 14  LFTs - wnl    RUQ U/S - 3 cm gallstone with borderline gallbladder wall thickening, no pericholecystic fluid. no sonographic Gardner's sign. CBD = 6 mm.      cholelithiasis  -Her presentation is fairly consistent with acute cholecystitis, but her U/S is not.  -HIDA scan to confirm acute cholecystitis  -If HIDA w/ morphine confirms acute cholecystitis, will plan urgent laparoscopic cholecystectomy.  -If HIDA is not consistent with acute cholecystitis, will need GI consult to evaluate of peptic ulcer disease.      I reviewed her labs and radiologic images.  care discussed with her daughter-in-law at bedside in the ER.

## 2022-08-12 NOTE — ED PROVIDER NOTE - ATTENDING CONTRIBUTION TO CARE
73 yo F, no pmhx, presenting w/ epigastric pain x2 days with burning like pain, h/o hiatal hernia, gi cocktail ordered, no prior surgeries.  check labs, neg murphys sign, benign abd, labs, us, reassess. 73 yo F, no pmhx, presenting w/ epigastric pain x2 days with burning like pain, h/o hiatal hernia, gi cocktail ordered, prior tubal ligation and orif surgeries.  check labs, neg murphys sign, benign abd, labs, us, reassess serial abd work up, work up for acute choley.

## 2022-08-12 NOTE — ED CDU PROVIDER DISPOSITION NOTE - CLINICAL COURSE
73 yo F, no pmhx, presenting w/ epigastric pain x2 days. Pt ate a large quantity of protein shake prior to onset of symptoms. She reports similar symptoms in the past 2/2 gall bladder problems. Her pain is epigastric w/ associated n/v and upper back pain. No fevers, chills, shortness of breath, weakness, numbness. Last BM was this AM. Denies BRBPR or melena. Surgical hx = tubal ligation. no recent surgery. nkda. no regular medications. Last ate yesterday.  In ED, most recent VSS. Labs + WBC 14.9. RUQ US + gallstones and mild wall thickening. Surgery consulted recommending HIDA scan. Surgery also recommending FOB, however per ED provider not indicated and pt may f/u outpatient with GI.  HIDA-- 73 yo F, no pmhx, presenting w/ epigastric pain x2 days. Pt ate a large quantity of protein shake prior to onset of symptoms. She reports similar symptoms in the past 2/2 gall bladder problems. Her pain is epigastric w/ associated n/v and upper back pain. No fevers, chills, shortness of breath, weakness, numbness. Last BM was this AM. Denies BRBPR or melena. Surgical hx = tubal ligation. no recent surgery. nkda. no regular medications. Last ate yesterday.  In ED, most recent VSS. Labs + WBC 14.9. RUQ US + gallstones and mild wall thickening. Surgery consulted recommending HIDA scan. Surgery also recommending FOB, however per ED provider not indicated and pt may f/u outpatient with GI.  HIDA +acute cholecystitis. Patient accepted to surgery service for admission. Dr. Shea aware.

## 2022-08-12 NOTE — ED ADULT NURSE NOTE - OBJECTIVE STATEMENT
Patient presents to Ed with c/o abd pain. Patient reports she has been experiencing epigastric and back pain with  nausea and vomiting for 2 days. Patient denies chest pain or sob +nausea and vomiting no diarrhea cough fever  chills headache or dizziness. LAC 20g iv lock placed labs drawn and sent.

## 2022-08-12 NOTE — ED CDU PROVIDER INITIAL DAY NOTE - ATTENDING CONTRIBUTION TO CARE
71 yo F, no pmhx, presenting w/ epigastric pain x2 days with burning like pain, h/o hiatal hernia, gi cocktail ordered, only tubal ligation and orif of hip will check labs, neg murphys sign, epigastric tenderness ntoed, labs, us, reassess.

## 2022-08-12 NOTE — ED CDU PROVIDER INITIAL DAY NOTE - DETAILS
RUQ pain; abnl US  - IVF  - pain control  - HIDA scan  - Surgery following  - Frequent re-eval  Case d/w ED Team

## 2022-08-12 NOTE — CONSULT NOTE ADULT - ASSESSMENT
72F no significant PMH s/p tubal ligation, self reported hx of reflux p/w epigastric abdominal pain of 12 hours, associated with nausea and vomiting x5. RUQ US with borderline wall thickening, but without pericholecystic fluid or Gardner's sign.     - Recommend stool guaiac   - HIDA scan to eval for cholecystitis   - GI consult vs. outpatient follow up for dyspepsia   - Recommend admission to medicine for further work up if continues to have vomiting and PO intolerance    D/w Dr. Frantz Camarillo, PGY-2  ACS  [id number]  72F no significant PMH s/p tubal ligation, self reported hx of reflux p/w epigastric abdominal pain of 12 hours, associated with nausea and vomiting x5. RUQ US with borderline wall thickening, but without pericholecystic fluid or Gardner's sign.     - Recommend stool guaiac   - HIDA scan to eval for cholecystitis   - GI consult vs. outpatient follow up   - Recommend admission to medicine for further work up if continues to have vomiting and PO intolerance    D/w Dr. Frantz Camarillo, PGY-2  ACS  [id number]

## 2022-08-12 NOTE — ED PROVIDER NOTE - PHYSICAL EXAMINATION
Gen: NAD, non-toxic appearing  Head: normal appearing  HEENT: normal conjunctiva  Lung: no respiratory distress, speaking in full sentences, ctab     CV: regular rate and rhythm, no murmurs  Abd: soft, non distended, non tender   MSK: no visible deformities  Neuro: alert and grossly oriented, no gross motor deficits  Skin: No tonya rashes

## 2022-08-12 NOTE — ED CDU PROVIDER DISPOSITION NOTE - ATTENDING APP SHARED VISIT CONTRIBUTION OF CARE
71 yo female admitted to observation unit with epigastric pain.  surgery saw, recommended HIDA scan; it was performed, concerning for acute cholecystitis.  surgery reconsulted, will admit patient to surgical floor service for further management.

## 2022-08-12 NOTE — ED ADULT NURSE REASSESSMENT NOTE - COMFORT CARE
NPO until HIDA results./ambulated to bathroom/darkened lights/plan of care explained/repositioned/warm blanket provided

## 2022-08-12 NOTE — CONSULT NOTE ADULT - SUBJECTIVE AND OBJECTIVE BOX
HPI:  72F no significant PMH s/p tubal ligation, self reported hx of reflux p/w epigastric abdominal pain of 12 hours, associated with nausea and vomiting x5. Patient reports history of pain after eating large meals, usually 1-2 hours, but milder and shorter lasting than current episode. Reports she has had reflux before but does not take acid reducing medications. Denies fevers, chills, constipation, diarrhea, hematemesis, melena. In the ED, labs notable for WBC 14.59, RUQ US without Gardner's sign or pericholecystic fluid. Gallbladder wall 4mm. Liver enzymes WNL.       PAST MEDICAL & SURGICAL HISTORY:  Vitamin D deficiency      Dry eye syndrome of both eyes      S/P ORIF (open reduction internal fixation) fracture  - left tibial plateau      S/P right breast biopsy  - benign      S/P tubal ligation            MEDICATIONS  (STANDING):  ondansetron Injectable 4 milliGRAM(s) IV Push once    MEDICATIONS  (PRN):      Allergies    codeine (Vomiting; Other; Pruritus)    Intolerances        SOCIAL HISTORY:    FAMILY HISTORY:  FHx: aortic aneurysm  father-     Family history of pacemaker  father-    Family history of stroke  father-    FHx: non-Hodgkin&#x27;s lymphoma  mother -             Physical Exam:  General: NAD, resting comfortably  HEENT: NC/AT, EOMI, normal hearing, no oral lesions, no LAD, neck supple  Pulmonary: normal resp effort  Cardiovascular: warm, well perfused   Abdominal: soft, ND/NT, no organomegaly  Extremities: WWP, normal strength, no clubbing/cyanosis/edema  Neuro: A/O x 3, CNs II-XII grossly intact, normal sensation, no focal deficits  Pulses: palpable distal pulses    Vital Signs Last 24 Hrs  T(C): 36.8 (12 Aug 2022 09:26), Max: 36.8 (12 Aug 2022 09:26)  T(F): 98.3 (12 Aug 2022 09:26), Max: 98.3 (12 Aug 2022 09:26)  HR: 73 (12 Aug 2022 09:26) (73 - 73)  BP: 156/98 (12 Aug 2022 09:26) (156/98 - 156/98)  BP(mean): --  RR: 20 (12 Aug 2022 09:26) (20 - 20)  SpO2: 98% (12 Aug 2022 09:26) (98% - 98%)    Parameters below as of 12 Aug 2022 09:26  Patient On (Oxygen Delivery Method): room air        I&O's Summary          LABS:                        14.4   14.89 )-----------( 227      ( 12 Aug 2022 11:03 )             43.5     0812    138  |  99  |  12  ----------------------------<  112<H>  4.3   |  24  |  0.55    Ca    9.1      12 Aug 2022 11:03  Mg     2.0     12    TPro  8.0  /  Alb  4.4  /  TBili  0.4  /  DBili  x   /  AST  21  /  ALT  15  /  AlkPhos  69  08-12    PT/INR - ( 12 Aug 2022 11:03 )   PT: 13.0 sec;   INR: 1.13 ratio         PTT - ( 12 Aug 2022 11:03 )  PTT:25.5 sec    CAPILLARY BLOOD GLUCOSE        LIVER FUNCTIONS - ( 12 Aug 2022 11:03 )  Alb: 4.4 g/dL / Pro: 8.0 g/dL / ALK PHOS: 69 U/L / ALT: 15 U/L / AST: 21 U/L / GGT: x             Cultures:      RADIOLOGY & ADDITIONAL STUDIES:  < from: US Abdomen Upper Quadrant Right (22 @ 11:50) >  IMPRESSION:  Cholelithiasis with borderline wall thickening. No evidence of   pericholecystic fluid or pain on examination. Recommend HIDA scan if   there is concern for acute cholecystitis.    --- End of Report ---    < end of copied text >

## 2022-08-12 NOTE — ED CDU PROVIDER INITIAL DAY NOTE - NSICDXFAMILYHX_GEN_ALL_CORE_FT
Statement Selected FAMILY HISTORY:  Family history of pacemaker, father-  Family history of stroke, father-  FHx: aortic aneurysm, father-   FHx: non-Hodgkin's lymphoma, mother -

## 2022-08-12 NOTE — ED ADULT NURSE NOTE - NSIMPLEMENTINTERV_GEN_ALL_ED
Implemented All Universal Safety Interventions:  [place] to call system. Call bell, personal items and telephone within reach. Instruct patient to call for assistance. Room bathroom lighting operational. Non-slip footwear when patient is off stretcher. Physically safe environment: no spills, clutter or unnecessary equipment. Stretcher in lowest position, wheels locked, appropriate side rails in place.

## 2022-08-13 ENCOUNTER — RESULT REVIEW (OUTPATIENT)
Age: 72
End: 2022-08-13

## 2022-08-13 DIAGNOSIS — K81.0 ACUTE CHOLECYSTITIS: ICD-10-CM

## 2022-08-13 LAB
ANION GAP SERPL CALC-SCNC: 14 MMOL/L — SIGNIFICANT CHANGE UP (ref 5–17)
APTT BLD: 29.2 SEC — SIGNIFICANT CHANGE UP (ref 27.5–35.5)
BUN SERPL-MCNC: 11 MG/DL — SIGNIFICANT CHANGE UP (ref 7–23)
CALCIUM SERPL-MCNC: 9.1 MG/DL — SIGNIFICANT CHANGE UP (ref 8.4–10.5)
CHLORIDE SERPL-SCNC: 95 MMOL/L — LOW (ref 96–108)
CO2 SERPL-SCNC: 26 MMOL/L — SIGNIFICANT CHANGE UP (ref 22–31)
CREAT SERPL-MCNC: 0.65 MG/DL — SIGNIFICANT CHANGE UP (ref 0.5–1.3)
EGFR: 94 ML/MIN/1.73M2 — SIGNIFICANT CHANGE UP
GLUCOSE SERPL-MCNC: 142 MG/DL — HIGH (ref 70–99)
HCT VFR BLD CALC: 47.2 % — HIGH (ref 34.5–45)
HGB BLD-MCNC: 15.6 G/DL — HIGH (ref 11.5–15.5)
INR BLD: 1.19 RATIO — HIGH (ref 0.88–1.16)
MAGNESIUM SERPL-MCNC: 2.1 MG/DL — SIGNIFICANT CHANGE UP (ref 1.6–2.6)
MCHC RBC-ENTMCNC: 30 PG — SIGNIFICANT CHANGE UP (ref 27–34)
MCHC RBC-ENTMCNC: 33.1 GM/DL — SIGNIFICANT CHANGE UP (ref 32–36)
MCV RBC AUTO: 90.8 FL — SIGNIFICANT CHANGE UP (ref 80–100)
NRBC # BLD: 0 /100 WBCS — SIGNIFICANT CHANGE UP (ref 0–0)
PHOSPHATE SERPL-MCNC: 2.5 MG/DL — SIGNIFICANT CHANGE UP (ref 2.5–4.5)
PLATELET # BLD AUTO: 252 K/UL — SIGNIFICANT CHANGE UP (ref 150–400)
POTASSIUM SERPL-MCNC: 3.8 MMOL/L — SIGNIFICANT CHANGE UP (ref 3.5–5.3)
POTASSIUM SERPL-SCNC: 3.8 MMOL/L — SIGNIFICANT CHANGE UP (ref 3.5–5.3)
PROTHROM AB SERPL-ACNC: 13.7 SEC — HIGH (ref 10.5–13.4)
RBC # BLD: 5.2 M/UL — SIGNIFICANT CHANGE UP (ref 3.8–5.2)
RBC # FLD: 12.9 % — SIGNIFICANT CHANGE UP (ref 10.3–14.5)
SODIUM SERPL-SCNC: 135 MMOL/L — SIGNIFICANT CHANGE UP (ref 135–145)
WBC # BLD: 23.96 K/UL — HIGH (ref 3.8–10.5)
WBC # FLD AUTO: 23.96 K/UL — HIGH (ref 3.8–10.5)

## 2022-08-13 PROCEDURE — 88304 TISSUE EXAM BY PATHOLOGIST: CPT | Mod: 26

## 2022-08-13 PROCEDURE — 47562 LAPAROSCOPIC CHOLECYSTECTOMY: CPT | Mod: GC

## 2022-08-13 PROCEDURE — 99217: CPT

## 2022-08-13 DEVICE — SURGICEL POWDER 3 GRAMS: Type: IMPLANTABLE DEVICE | Status: FUNCTIONAL

## 2022-08-13 DEVICE — LIGATING CLIPS WECK HEMOLOK POLYMER MEDIUM-LARGE (GREEN) 6: Type: IMPLANTABLE DEVICE | Status: FUNCTIONAL

## 2022-08-13 RX ORDER — ENOXAPARIN SODIUM 100 MG/ML
40 INJECTION SUBCUTANEOUS EVERY 24 HOURS
Refills: 0 | Status: DISCONTINUED | OUTPATIENT
Start: 2022-08-13 | End: 2022-08-15

## 2022-08-13 RX ORDER — ONDANSETRON 8 MG/1
4 TABLET, FILM COATED ORAL ONCE
Refills: 0 | Status: DISCONTINUED | OUTPATIENT
Start: 2022-08-13 | End: 2022-08-13

## 2022-08-13 RX ORDER — ACETAMINOPHEN 500 MG
975 TABLET ORAL EVERY 4 HOURS
Refills: 0 | Status: DISCONTINUED | OUTPATIENT
Start: 2022-08-13 | End: 2022-08-13

## 2022-08-13 RX ORDER — SODIUM CHLORIDE 9 MG/ML
1000 INJECTION, SOLUTION INTRAVENOUS
Refills: 0 | Status: DISCONTINUED | OUTPATIENT
Start: 2022-08-13 | End: 2022-08-14

## 2022-08-13 RX ORDER — HYDROMORPHONE HYDROCHLORIDE 2 MG/ML
0.25 INJECTION INTRAMUSCULAR; INTRAVENOUS; SUBCUTANEOUS
Refills: 0 | Status: DISCONTINUED | OUTPATIENT
Start: 2022-08-13 | End: 2022-08-13

## 2022-08-13 RX ORDER — PIPERACILLIN AND TAZOBACTAM 4; .5 G/20ML; G/20ML
3.38 INJECTION, POWDER, LYOPHILIZED, FOR SOLUTION INTRAVENOUS EVERY 8 HOURS
Refills: 0 | Status: DISCONTINUED | OUTPATIENT
Start: 2022-08-13 | End: 2022-08-14

## 2022-08-13 RX ORDER — ACETAMINOPHEN 500 MG
1000 TABLET ORAL EVERY 6 HOURS
Refills: 0 | Status: DISCONTINUED | OUTPATIENT
Start: 2022-08-13 | End: 2022-08-14

## 2022-08-13 RX ORDER — PIPERACILLIN AND TAZOBACTAM 4; .5 G/20ML; G/20ML
3.38 INJECTION, POWDER, LYOPHILIZED, FOR SOLUTION INTRAVENOUS ONCE
Refills: 0 | Status: COMPLETED | OUTPATIENT
Start: 2022-08-13 | End: 2022-08-13

## 2022-08-13 RX ADMIN — Medication 400 MILLIGRAM(S): at 09:26

## 2022-08-13 RX ADMIN — ENOXAPARIN SODIUM 40 MILLIGRAM(S): 100 INJECTION SUBCUTANEOUS at 03:16

## 2022-08-13 RX ADMIN — Medication 1000 MILLIGRAM(S): at 00:00

## 2022-08-13 RX ADMIN — SODIUM CHLORIDE 100 MILLILITER(S): 9 INJECTION, SOLUTION INTRAVENOUS at 03:14

## 2022-08-13 RX ADMIN — SODIUM CHLORIDE 50 MILLILITER(S): 9 INJECTION, SOLUTION INTRAVENOUS at 18:01

## 2022-08-13 RX ADMIN — Medication 1000 MILLIGRAM(S): at 09:56

## 2022-08-13 RX ADMIN — PIPERACILLIN AND TAZOBACTAM 200 GRAM(S): 4; .5 INJECTION, POWDER, LYOPHILIZED, FOR SOLUTION INTRAVENOUS at 03:14

## 2022-08-13 RX ADMIN — Medication 400 MILLIGRAM(S): at 21:05

## 2022-08-13 RX ADMIN — Medication 1000 MILLIGRAM(S): at 21:57

## 2022-08-13 RX ADMIN — PIPERACILLIN AND TAZOBACTAM 25 GRAM(S): 4; .5 INJECTION, POWDER, LYOPHILIZED, FOR SOLUTION INTRAVENOUS at 09:25

## 2022-08-13 NOTE — H&P ADULT - NSHPPHYSICALEXAM_GEN_ALL_CORE
Vital Signs Last 24 Hrs  T(C): 36.8 (12 Aug 2022 09:26), Max: 36.8 (12 Aug 2022 09:26)  T(F): 98.3 (12 Aug 2022 09:26), Max: 98.3 (12 Aug 2022 09:26)  HR: 73 (12 Aug 2022 09:26) (73 - 73)  BP: 156/98 (12 Aug 2022 09:26) (156/98 - 156/98)  BP(mean): --  RR: 20 (12 Aug 2022 09:26) (20 - 20)  SpO2: 98% (12 Aug 2022 09:26) (98% - 98%)    Physical Exam:  General: NAD, resting comfortably  Pulmonary: normal resp effort  Cardiovascular: warm, well perfused extremities  Abdominal: soft, mild RUQ tenderness, no organomegaly

## 2022-08-13 NOTE — H&P ADULT - HISTORY OF PRESENT ILLNESS
72F no significant PMH s/p tubal ligation, self reported hx of reflux p/w epigastric abdominal pain of 12 hours, associated with nausea and vomiting x5. Patient reports history of pain after eating large meals, usually 1-2 hours, but milder and shorter lasting than current episode. Reports she has had reflux before but does not take acid reducing medications. Denies fevers, chills, constipation, diarrhea, hematemesis, melena. In the ED, labs notable for WBC 14.59, RUQ US without Gardner's sign or pericholecystic fluid. Gallbladder wall 4mm. Liver enzymes WNL.     HIDA scan positive.

## 2022-08-13 NOTE — BRIEF OPERATIVE NOTE - OPERATION/FINDINGS
Laparoscopic Cholecystectomy  - Inflamed distended gallbladder decompressed with aspiration needle  - Critical view of safety achieved

## 2022-08-13 NOTE — PATIENT PROFILE ADULT - FALL HARM RISK - HARM RISK INTERVENTIONS

## 2022-08-13 NOTE — H&P ADULT - NSHPLABSRESULTS_GEN_ALL_CORE
LABS:                          14.4   14.89 )-----------( 227      ( 12 Aug 2022 11:03 )             43.5       08-12    138  |  99  |  12  ----------------------------<  112<H>  4.3   |  24  |  0.55    Ca    9.1      12 Aug 2022 11:03  Mg     2.0     08-12    TPro  8.0  /  Alb  4.4  /  TBili  0.4  /  DBili  x   /  AST  21  /  ALT  15  /  AlkPhos  69  08-12       PT/INR - ( 12 Aug 2022 11:03 )   PT: 13.0 sec;   INR: 1.13 ratio      PTT - ( 12 Aug 2022 11:03 )  PTT:25.5 sec    _______________________________________  RADIOLOGY & ADDITIONAL STUDIES:    < from: US Abdomen Upper Quadrant Right (08.12.22 @ 11:50) >    IMPRESSION:  Cholelithiasis with borderline wall thickening. No evidence of   pericholecystic fluid or pain on examination. Recommend HIDA scan if   there is concern for acute cholecystitis.    < end of copied text >  ====    HIDA positive, formal read pending

## 2022-08-13 NOTE — PATIENT PROFILE ADULT - FUNCTIONAL ASSESSMENT - BASIC MOBILITY SCORE.
TRANSFER OF CARE FOR CHF / PULMONARY EDEMA       PRIMARY CARE PHYSICIAN:   Syeda Perez MD     Consulting provider: sabrina honeycutt md  Requesting provider; Dustin Mukherjee MD    HISTORY OF PRESENT ILLNESS:   Patient is a 73 year old male with rheumatoid arthritis presented to hospital with c/o increasing severity and frequency of coughing. Pt states that his cough is dry today (was productive prior to admission), he becomes sob when coughing. Pt also has noted increased aranda (pt appears sob with talking and minimal movement) and fluid retention in his ankles (this resolves with elevation of feet).     Pt denies any syncopal episodes, lightheadedness, dizziness, chest pain, orthopnea, pnd, palpitations or signs of overt bleeding.      PAST MEDICAL HISTORY:     Depression                                                      Comment: In the 70's Situational depression with                relationship in the 1970's    Hernia                                                          Comment: Bilateral inguinal hernias    Chest pain                                      5/20/2013       Comment: ER visit/negative enzymes    Urinary incontinence                                          Arthritis                                                       Comment: RA    Anxiety                                                       MEDICATIONS:   Outpatient Medications Marked as Taking for the 11/9/20 encounter (Hospital Encounter)   Medication Sig Dispense Refill   • capsaicin (ZOSTRIX) 0.025 % cream Apply 1 application topically as needed. Indications: Pain      • ciclopirox (PENLAC) 8 % topical solution Apply 1 application topically daily.     • ibuprofen (MOTRIN) 800 MG tablet Take 800 mg by mouth 2 times daily as needed for Pain.     • ketoconazole (NIZORAL) 2 % cream Apply 1 application topically as needed. Indications: skin care      • Tofacitinib Citrate ER 11 MG TABLET SR 24 HR Occupational History   • Occupation: [employer] business   Social Needs   • Financial resource strain: Not on file   • Food insecurity     Worry: Not on file     Inability: Not on file   • Transportation needs     Medical: Not on file     Non-medical: Not on file   Tobacco Use   • Smoking status: Former Smoker     Packs/day: 1.00     Years: 8.00     Pack years: 8.00     Types: Cigarettes     Quit date: 1978     Years since quittin.8   • Smokeless tobacco: Never Used   Substance and Sexual Activity   • Alcohol use: Yes     Comment: occasional   • Drug use: Yes     Types: Cannabinols, Marijuana     Comment: last use roughly 6 months ago   • Sexual activity: Yes     Partners: Female   Lifestyle   • Physical activity     Days per week: Not on file     Minutes per session: Not on file   • Stress: Not on file   Relationships   • Social connections     Talks on phone: Not on file     Gets together: Not on file     Attends Holiness service: Not on file     Active member of club or organization: Not on file     Attends meetings of clubs or organizations: Not on file     Relationship status: Not on file   • Intimate partner violence     Fear of current or ex partner: Not on file     Emotionally abused: Not on file     Physically abused: Not on file     Forced sexual activity: Not on file   Other Topics Concern   • Not on file   Social History Narrative   • Not on file     Pt lives at home with his wife, he is retired from the entertainment business. He assists with house cleaning, laundry and shopping. He states that he uses the treadmill / stationary bike nearly everyday (20 minutes on each). He states that he quit smoking cigarettes  (had smoked for 10 years), quits smoking marijuana 1 year ago, has alcohol on occasion.       REVIEW OF SYSTEMS:   Constitutional: There is no history of fevers or chills  Eyes: Patient denies blurred vision  ENMT: No history of ear pain  Cardiovascular: Denies history of  chest pain or palpitations  Respiratory: see hpi  Gastrointestinal: No history of abdominal pain, nausea or vomiting  Genitourinary: There is no history of dysuria  Musculoskeletal: Denies history of new joint pain   Skin: No history of skin rashes  Neurologic: No history of numbness, tingling or weakness  All other systems that were reviewed are negative    PHYSICAL EXAMINATION:   VITAL SIGNS:    Visit Vitals  /60 (BP Location: RUE - Right upper extremity, Patient Position: Semi-Underwood's)   Pulse 98   Temp 98 °F (36.7 °C) (Oral)   Resp 16   Ht 6' (1.829 m)   Wt 63.4 kg   SpO2 98%   BMI 18.96 kg/m²     GENERAL: This is a 73 year old male in no acute distress  PSYCHIATRIC: He is awake alert and oriented to time, place and person. Mood and affect are appropriate.  HEAD: Appears to be atraumatic and normocephalic.   EYES: Reveal no icterus scleral injection or conjunctival pallor. Pupils   equal, round and reactive to light and accommodation.  Extraocular movements appear to be intact  THROAT: Oropharyngeal exam reveals moist oral mucosa. There is no erythema, discharge or thrush. Dentition is good  NECK: Supple and symmetric. There is no JVD (jugular venous distension) or thyromegaly .    LUNGS: Reveals good effort and lungs are clear to auscultation bilaterally, rales in bases. There are no wheezes or rhonchi.  HEART: No thrills. Reveals presence of first and second heart sounds. Regular rate and rhythm. + murmurs, no rubs or gallops.  ABDOMEN:  Normoactive bowel sounds. Soft and nontender. There is no rebound tenderness.  There is no hepatosplenomegaly.  EXTREMITIES: No clubbing, cyanosis or edema  NEUROLOGIC: Cranial nerves III through XII appear grossly intact. Sensation is intact.  SKIN: Appears unremarkable and no rashes are present.    LABS:  Lab Results   Component Value Date    SODIUM 134 (L) 11/10/2020    POTASSIUM 3.7 11/10/2020    CHLORIDE 103 11/10/2020    CO2 23 11/10/2020    GLUCOSE 116 (H)  Medication list, vitals, and labs reviewed. Agree with the assessment and plan, which was formulated by me.  Lungs - decreased b/l , no rales   cvs - regular , no s3/s4  Severe LV/RV dysfunction by echo   Pt still has orthopnea - start milrinone and continue lasix.   R/LHC in am   Strict I/os and daily wts     Justen Murray MD  11/11/2020  1:26 PM       24

## 2022-08-13 NOTE — ED CDU PROVIDER SUBSEQUENT DAY NOTE - NS ED ROS FT
Constitutional: No fever or chills  Eyes: No visual changes, eye pain   CV: No chest pain or lower extremity edema  Resp: No SOB no cough  GI: + abd pain. + nausea + vomiting. No diarrhea.   : No dysuria, hematuria.   MSK: No musculoskeletal pain  Skin: No rash  Psych: No complaints   Neuro: No headache. No numbness or tingling.   Endo: No known diabetes

## 2022-08-13 NOTE — ED CDU PROVIDER SUBSEQUENT DAY NOTE - PROGRESS NOTE DETAILS
Re-paged surgery, they called back stating that they will come and see patient. - Janine Mccoy PA-C Spoke with surgery team, ok to admit patient to their service.  Surgery team to put in orders.   CDU attending Dr. Shabbir baires - Janine Mccoy PA-C

## 2022-08-13 NOTE — H&P ADULT - ASSESSMENT
72F no significant PMH s/p tubal ligation, self reported hx of reflux p/w epigastric abdominal pain of 12 hours, associated with nausea and vomiting x5 with US equivocal for cholecystitis however HIDA positive.    PLAN:  - Admit to Acute Care Surgery under Dr. Frantz Henderson, floor bed  - NPO/IVF  - Zosyn  - Added on to OR for lap balbir, possible open  - Pain control    D/w Dr. Beatriz Barron, PGY3  Acute Care Surgery [id number]

## 2022-08-13 NOTE — ED ADULT NURSE REASSESSMENT NOTE - NS ED NURSE REASSESS COMMENT FT1
Report given to Ximena LOCO on 2 monti 223W, VSS, Zosyn infusing per order, tolerating well. Left unit via stretcher with daughter in law and transporter at bedside, safety and comfort maintained. Left unit with own belongings.

## 2022-08-14 ENCOUNTER — TRANSCRIPTION ENCOUNTER (OUTPATIENT)
Age: 72
End: 2022-08-14

## 2022-08-14 LAB
ALBUMIN SERPL ELPH-MCNC: 3.4 G/DL — SIGNIFICANT CHANGE UP (ref 3.3–5)
ALP SERPL-CCNC: 58 U/L — SIGNIFICANT CHANGE UP (ref 40–120)
ALT FLD-CCNC: 55 U/L — HIGH (ref 10–45)
ANION GAP SERPL CALC-SCNC: 11 MMOL/L — SIGNIFICANT CHANGE UP (ref 5–17)
AST SERPL-CCNC: 55 U/L — HIGH (ref 10–40)
BILIRUB SERPL-MCNC: 0.4 MG/DL — SIGNIFICANT CHANGE UP (ref 0.2–1.2)
BUN SERPL-MCNC: 19 MG/DL — SIGNIFICANT CHANGE UP (ref 7–23)
CALCIUM SERPL-MCNC: 8.2 MG/DL — LOW (ref 8.4–10.5)
CHLORIDE SERPL-SCNC: 97 MMOL/L — SIGNIFICANT CHANGE UP (ref 96–108)
CO2 SERPL-SCNC: 28 MMOL/L — SIGNIFICANT CHANGE UP (ref 22–31)
CREAT SERPL-MCNC: 0.84 MG/DL — SIGNIFICANT CHANGE UP (ref 0.5–1.3)
EGFR: 74 ML/MIN/1.73M2 — SIGNIFICANT CHANGE UP
GLUCOSE SERPL-MCNC: 103 MG/DL — HIGH (ref 70–99)
HCT VFR BLD CALC: 40.4 % — SIGNIFICANT CHANGE UP (ref 34.5–45)
HCT VFR BLD CALC: 40.8 % — SIGNIFICANT CHANGE UP (ref 34.5–45)
HCV AB S/CO SERPL IA: 0.05 S/CO — SIGNIFICANT CHANGE UP (ref 0–0.99)
HCV AB SERPL-IMP: SIGNIFICANT CHANGE UP
HGB BLD-MCNC: 13.4 G/DL — SIGNIFICANT CHANGE UP (ref 11.5–15.5)
HGB BLD-MCNC: 13.4 G/DL — SIGNIFICANT CHANGE UP (ref 11.5–15.5)
MAGNESIUM SERPL-MCNC: 2.1 MG/DL — SIGNIFICANT CHANGE UP (ref 1.6–2.6)
MCHC RBC-ENTMCNC: 29.8 PG — SIGNIFICANT CHANGE UP (ref 27–34)
MCHC RBC-ENTMCNC: 30.3 PG — SIGNIFICANT CHANGE UP (ref 27–34)
MCHC RBC-ENTMCNC: 32.8 GM/DL — SIGNIFICANT CHANGE UP (ref 32–36)
MCHC RBC-ENTMCNC: 33.2 GM/DL — SIGNIFICANT CHANGE UP (ref 32–36)
MCV RBC AUTO: 90.9 FL — SIGNIFICANT CHANGE UP (ref 80–100)
MCV RBC AUTO: 91.4 FL — SIGNIFICANT CHANGE UP (ref 80–100)
NRBC # BLD: 0 /100 WBCS — SIGNIFICANT CHANGE UP (ref 0–0)
NRBC # BLD: 0 /100 WBCS — SIGNIFICANT CHANGE UP (ref 0–0)
PHOSPHATE SERPL-MCNC: 2.4 MG/DL — LOW (ref 2.5–4.5)
PLATELET # BLD AUTO: 241 K/UL — SIGNIFICANT CHANGE UP (ref 150–400)
PLATELET # BLD AUTO: 244 K/UL — SIGNIFICANT CHANGE UP (ref 150–400)
POTASSIUM SERPL-MCNC: 3.2 MMOL/L — LOW (ref 3.5–5.3)
POTASSIUM SERPL-SCNC: 3.2 MMOL/L — LOW (ref 3.5–5.3)
PROT SERPL-MCNC: 6.9 G/DL — SIGNIFICANT CHANGE UP (ref 6–8.3)
RBC # BLD: 4.42 M/UL — SIGNIFICANT CHANGE UP (ref 3.8–5.2)
RBC # BLD: 4.49 M/UL — SIGNIFICANT CHANGE UP (ref 3.8–5.2)
RBC # FLD: 13.1 % — SIGNIFICANT CHANGE UP (ref 10.3–14.5)
RBC # FLD: 13.2 % — SIGNIFICANT CHANGE UP (ref 10.3–14.5)
SODIUM SERPL-SCNC: 136 MMOL/L — SIGNIFICANT CHANGE UP (ref 135–145)
WBC # BLD: 22.95 K/UL — HIGH (ref 3.8–10.5)
WBC # BLD: 26.86 K/UL — HIGH (ref 3.8–10.5)
WBC # FLD AUTO: 22.95 K/UL — HIGH (ref 3.8–10.5)
WBC # FLD AUTO: 26.86 K/UL — HIGH (ref 3.8–10.5)

## 2022-08-14 RX ORDER — PIPERACILLIN AND TAZOBACTAM 4; .5 G/20ML; G/20ML
3.38 INJECTION, POWDER, LYOPHILIZED, FOR SOLUTION INTRAVENOUS EVERY 8 HOURS
Refills: 0 | Status: DISCONTINUED | OUTPATIENT
Start: 2022-08-14 | End: 2022-08-15

## 2022-08-14 RX ORDER — ACETAMINOPHEN 500 MG
975 TABLET ORAL EVERY 6 HOURS
Refills: 0 | Status: DISCONTINUED | OUTPATIENT
Start: 2022-08-14 | End: 2022-08-15

## 2022-08-14 RX ORDER — POTASSIUM PHOSPHATE, MONOBASIC POTASSIUM PHOSPHATE, DIBASIC 236; 224 MG/ML; MG/ML
15 INJECTION, SOLUTION INTRAVENOUS ONCE
Refills: 0 | Status: COMPLETED | OUTPATIENT
Start: 2022-08-14 | End: 2022-08-14

## 2022-08-14 RX ORDER — ACETAMINOPHEN 500 MG
1000 TABLET ORAL ONCE
Refills: 0 | Status: COMPLETED | OUTPATIENT
Start: 2022-08-14 | End: 2022-08-14

## 2022-08-14 RX ORDER — ACETAMINOPHEN 500 MG
975 TABLET ORAL EVERY 6 HOURS
Refills: 0 | Status: DISCONTINUED | OUTPATIENT
Start: 2022-08-14 | End: 2022-08-14

## 2022-08-14 RX ORDER — OXYCODONE HYDROCHLORIDE 5 MG/1
5 TABLET ORAL EVERY 4 HOURS
Refills: 0 | Status: DISCONTINUED | OUTPATIENT
Start: 2022-08-14 | End: 2022-08-15

## 2022-08-14 RX ADMIN — Medication 400 MILLIGRAM(S): at 18:17

## 2022-08-14 RX ADMIN — PIPERACILLIN AND TAZOBACTAM 25 GRAM(S): 4; .5 INJECTION, POWDER, LYOPHILIZED, FOR SOLUTION INTRAVENOUS at 17:11

## 2022-08-14 RX ADMIN — Medication 975 MILLIGRAM(S): at 23:57

## 2022-08-14 RX ADMIN — Medication 1000 MILLIGRAM(S): at 03:29

## 2022-08-14 RX ADMIN — PIPERACILLIN AND TAZOBACTAM 25 GRAM(S): 4; .5 INJECTION, POWDER, LYOPHILIZED, FOR SOLUTION INTRAVENOUS at 10:04

## 2022-08-14 RX ADMIN — PIPERACILLIN AND TAZOBACTAM 25 GRAM(S): 4; .5 INJECTION, POWDER, LYOPHILIZED, FOR SOLUTION INTRAVENOUS at 00:40

## 2022-08-14 RX ADMIN — Medication 400 MILLIGRAM(S): at 02:55

## 2022-08-14 RX ADMIN — POTASSIUM PHOSPHATE, MONOBASIC POTASSIUM PHOSPHATE, DIBASIC 62.5 MILLIMOLE(S): 236; 224 INJECTION, SOLUTION INTRAVENOUS at 13:19

## 2022-08-14 RX ADMIN — ENOXAPARIN SODIUM 40 MILLIGRAM(S): 100 INJECTION SUBCUTANEOUS at 02:56

## 2022-08-14 RX ADMIN — PIPERACILLIN AND TAZOBACTAM 25 GRAM(S): 4; .5 INJECTION, POWDER, LYOPHILIZED, FOR SOLUTION INTRAVENOUS at 22:06

## 2022-08-14 NOTE — PROGRESS NOTE ADULT - ASSESSMENT
72F no significant PMH s/p tubal ligation, self reported hx of reflux p/w epigastric abdominal pain of 12 hours, associated with nausea and vomiting x5 with US equivocal for cholecystitis however HIDA positive. (8/13) Lap balbir performed with no complications. Patient will be monitored overnight and sent home the next day.    PLAN:  - NPO/IVF  - Zosyn  - Added on to OR for lap balbir, possible open  - Pain control 72F no significant PMH s/p tubal ligation, self reported hx of reflux p/w epigastric abdominal pain of 12 hours, associated with nausea and vomiting x5 with US equivocal for cholecystitis however HIDA positive. (8/13) Lap balbir performed with no complications. Patient will be monitored overnight and sent home the next day.    PLAN:  - Diet: reg (tolerating)  - Zosyn  - Added on to OR for lap balbir, possible open  - Pain control      ACS  [id number] 72F no significant PMH s/p tubal ligation, self reported hx of reflux p/w epigastric abdominal pain of 12 hours, associated with nausea and vomiting x5 with US equivocal for cholecystitis however HIDA positive. (8/13) Lap balbir performed with no complications. Patient will be monitored overnight and sent home the next day.    PLAN:  - Diet: reg (tolerating)  - Zosyn  - Monitor WBC  - Pain control      ACS  [id number]

## 2022-08-14 NOTE — DISCHARGE NOTE PROVIDER - NSDCMRMEDTOKEN_GEN_ALL_CORE_FT
ibuprofen 600 mg oral tablet: 1 tab(s) orally every 6 hours, As Needed -for moderate pain   Refresh Dry Eye Therapy ophthalmic solution: 1 drop(s) to each affected eye 2 times a day, As Needed  traMADol 50 mg oral tablet: 1 tab(s) orally every 4 hours, As Needed -for severe pain MDD:6  Vitamin B Complex 100: 2 cap(s) orally once a day  Vitamin C 500 mg oral tablet: 1 tab(s) orally once a day  Vitamin D3 5000 intl units (125 mcg) oral tablet: 1 tab(s) orally once a day   acetaminophen 325 mg oral tablet: 3 tab(s) orally every 6 hours  ibuprofen 600 mg oral tablet: 1 tab(s) orally every 6 hours, As Needed -for moderate pain   Refresh Dry Eye Therapy ophthalmic solution: 1 drop(s) to each affected eye 2 times a day, As Needed  traMADol 50 mg oral tablet: 1 tab(s) orally every 4 hours, As Needed -for severe pain MDD:6  Vitamin B Complex 100: 2 cap(s) orally once a day  Vitamin C 500 mg oral tablet: 1 tab(s) orally once a day  Vitamin D3 5000 intl units (125 mcg) oral tablet: 1 tab(s) orally once a day   acetaminophen 325 mg oral tablet: 3 tab(s) orally every 6 hours  ibuprofen 600 mg oral tablet: 1 tab(s) orally every 6 hours, As Needed -for moderate pain   Refresh Dry Eye Therapy ophthalmic solution: 1 drop(s) to each affected eye 2 times a day, As Needed  Vitamin B Complex 100: 2 cap(s) orally once a day  Vitamin C 500 mg oral tablet: 1 tab(s) orally once a day  Vitamin D3 5000 intl units (125 mcg) oral tablet: 1 tab(s) orally once a day

## 2022-08-14 NOTE — DISCHARGE NOTE PROVIDER - NSDCCPCAREPLAN_GEN_ALL_CORE_FT
PRINCIPAL DISCHARGE DIAGNOSIS  Diagnosis: Acute cholecystitis  Assessment and Plan of Treatment: WOUND CARE: Please do not remove your steri-strips.   BATHING: Please do not submerge wound underwater. You may shower and/or sponge bathe at 24 hours after discharge.   ACTIVITY: No heavy lifting of anything more than 10-15 pounds or anything that will cause straining. Otherwise, you may return to your usual level of physical activity. If you are taking narcotic pain medication (such as Percocet or Oxycodone), do NOT drive a car, operate machinery, or make important decisions.   DIET: Please return to your usual diet.  NOTIFY YOUR SURGEON: If you have any bleeding that does not stop, any pus draining from your wound, any fever (over 100.4) or chills, persistent nausea/vomiting with inability to tolerate food or liquids, persistent diarrhea, or if your pain is not controlled on your discharge pain medications.   FOLLOW-UP:   1. Please make a follow-up appointment with Dr. Vargas 1-2 weeks of discharge.   2. Please follow-up with your primary care physician in 1 week regarding your hospitalization.         PRINCIPAL DISCHARGE DIAGNOSIS  Diagnosis: Acute cholecystitis  Assessment and Plan of Treatment: WOUND CARE: Please do not remove your steri-strips. You may shower. Pat Dry abdomen. Leave the white steri strips in place, they will fall off on their own in approximately 5-7 days.  BATHING: Please do not submerge wound underwater. You may shower and/or sponge bathe at 24 hours after discharge.   ACTIVITY: No heavy lifting of anything more than 10-15 pounds or anything that will cause straining. Otherwise, you may return to your usual level of physical activity. If you are taking narcotic pain medication (such as Percocet or Oxycodone), do NOT drive a car, operate machinery, or make important decisions.   DIET: Please return to your usual diet.  NOTIFY YOUR SURGEON: If you have any bleeding that does not stop, any pus draining from your wound, any fever (over 100.4) or chills, persistent nausea/vomiting with inability to tolerate food or liquids, persistent diarrhea, or if your pain is not controlled on your discharge pain medications.   FOLLOW-UP:   1. Please make a follow-up appointment with Dr. Vargas 1-2 weeks of discharge.   2. Please follow-up with your primary care physician in 1 week regarding your hospitalization.         PRINCIPAL DISCHARGE DIAGNOSIS  Diagnosis: Acute cholecystitis  Assessment and Plan of Treatment: WOUND CARE: Please do not remove your steri-strips. You may shower. Pat Dry abdomen. Leave the white steri strips in place, they will fall off on their own in approximately 5-7 days.  BATHING: Please do not submerge wound underwater. You may shower and/or sponge bathe at 24 hours after discharge.   ACTIVITY: No heavy lifting of anything more than 10-15 pounds or anything that will cause straining. Otherwise, you may return to your usual level of physical activity. If you are taking narcotic pain medication (such as Percocet or Oxycodone), do NOT drive a car, operate machinery, or make important decisions.   DIET: Please return to your usual diet.  NOTIFY YOUR SURGEON: If you have any bleeding that does not stop, any pus draining from your wound, any fever (over 100.4) or chills, persistent nausea/vomiting with inability to tolerate food or liquids, persistent diarrhea, or if your pain is not controlled on your discharge pain medications.   FOLLOW-UP:   1. Please make a follow-up appointment with Dr. Bashir within 1-2 weeks of discharge.   2. Please follow-up with your primary care physician in 1 week regarding your hospitalization.

## 2022-08-14 NOTE — DISCHARGE NOTE PROVIDER - CARE PROVIDER_API CALL
Chantel Bashir (MD)  Surgery; Surgical Critical Care  [address], [address]  [place] [address]  Phone: [phone]  Fax: [phone]  Follow Up Time: 1 week

## 2022-08-14 NOTE — DISCHARGE NOTE PROVIDER - NSDCACTIVITY_GEN_ALL_CORE
Sex allowed/Showering allowed/No heavy lifting/straining/Follow Instructions Provided by your Surgical Team Showering allowed/Walking - Indoors allowed/No heavy lifting/straining/Walking - Outdoors allowed/Follow Instructions Provided by your Surgical Team

## 2022-08-14 NOTE — DISCHARGE NOTE PROVIDER - NSDCFUSCHEDAPPT_GEN_ALL_CORE_FT
[facility] Physician [facility]  UROGYN [address]  Scheduled Appointment: 09/20/2022

## 2022-08-14 NOTE — PROGRESS NOTE ADULT - SUBJECTIVE AND OBJECTIVE BOX
POD #    24hr events:  -     Overnight events:   - No acute events    SUBJECTIVE:      OBJECTIVE:  Vital Signs Last 24 Hrs  T(C): 37.1 (14 Aug 2022 09:41), Max: 37.4 (13 Aug 2022 11:06)  T(F): 98.8 (14 Aug 2022 09:41), Max: 99.3 (13 Aug 2022 11:06)  HR: 81 (14 Aug 2022 09:41) (70 - 87)  BP: 133/67 (14 Aug 2022 09:41) (103/58 - 151/81)  BP(mean): 83 (13 Aug 2022 18:30) (83 - 101)  RR: 18 (14 Aug 2022 09:41) (14 - 18)  SpO2: 95% (14 Aug 2022 09:41) (92% - 98%)    Parameters below as of 14 Aug 2022 09:41  Patient On (Oxygen Delivery Method): room air          08-13-22 @ 07:01  -  08-14-22 @ 07:00  --------------------------------------------------------  IN: 900 mL / OUT: 500 mL / NET: 400 mL    08-14-22 @ 07:01  -  08-14-22 @ 09:54  --------------------------------------------------------  IN: 240 mL / OUT: 0 mL / NET: 240 mL        Physical Examination:  GEN: NAD, resting quietly  PULM: symmetric chest rise bilaterally, no increased WOB  ABD: soft, appropriately tender, nondistended, no rebound or guarding, incision CDI  EXTR: no LE erythema, moving all extremities      LABS:                        13.4   26.86 )-----------( 244      ( 14 Aug 2022 07:08 )             40.8       08-14    136  |  97  |  19  ----------------------------<  103<H>  3.2<L>   |  28  |  0.84    Ca    8.2<L>      14 Aug 2022 07:12  Phos  2.4     08-14  Mg     2.1     08-14    TPro  6.9  /  Alb  3.4  /  TBili  0.4  /  DBili  x   /  AST  55<H>  /  ALT  55<H>  /  AlkPhos  58  08-14 POD #1 Lap balbir    Overnight events:   - No acute events    SUBJECTIVE:  Patient was seen and examined this morning by the surgical team. No complaints. Denied nausea/vomiting, fevers, chest pain. Has been urinating and passing gas.    OBJECTIVE:  Vital Signs Last 24 Hrs  T(C): 37.1 (14 Aug 2022 09:41), Max: 37.4 (13 Aug 2022 11:06)  T(F): 98.8 (14 Aug 2022 09:41), Max: 99.3 (13 Aug 2022 11:06)  HR: 81 (14 Aug 2022 09:41) (70 - 87)  BP: 133/67 (14 Aug 2022 09:41) (103/58 - 151/81)  BP(mean): 83 (13 Aug 2022 18:30) (83 - 101)  RR: 18 (14 Aug 2022 09:41) (14 - 18)  SpO2: 95% (14 Aug 2022 09:41) (92% - 98%)    Parameters below as of 14 Aug 2022 09:41  Patient On (Oxygen Delivery Method): room air          08-13-22 @ 07:01  -  08-14-22 @ 07:00  --------------------------------------------------------  IN: 900 mL / OUT: 500 mL / NET: 400 mL    08-14-22 @ 07:01  -  08-14-22 @ 09:54  --------------------------------------------------------  IN: 240 mL / OUT: 0 mL / NET: 240 mL        Physical Examination:  GEN: NAD, resting quietly  PULM: symmetric chest rise bilaterally, no increased WOB  ABD: soft, appropriately tender at RUQ, nondistended, no rebound or guarding, incision CDI  EXTR: no LE erythema, moving all extremities      LABS:                        13.4   26.86 )-----------( 244      ( 14 Aug 2022 07:08 )             40.8       08-14    136  |  97  |  19  ----------------------------<  103<H>  3.2<L>   |  28  |  0.84    Ca    8.2<L>      14 Aug 2022 07:12  Phos  2.4     08-14  Mg     2.1     08-14    TPro  6.9  /  Alb  3.4  /  TBili  0.4  /  DBili  x   /  AST  55<H>  /  ALT  55<H>  /  AlkPhos  58  08-14

## 2022-08-14 NOTE — DISCHARGE NOTE PROVIDER - HOSPITAL COURSE
72F no significant PMH s/p tubal ligation, self reported hx of reflux p/w epigastric abdominal pain of 12 hours, associated with nausea and vomiting x5. Patient reports history of pain after eating large meals, usually 1-2 hours, but milder and shorter lasting than current episode. Reports she has had reflux before but does not take acid reducing medications. Denies fevers, chills, constipation, diarrhea, hematemesis, melena. In the ED, labs notable for WBC 14.59, RUQ US without Gardner's sign or pericholecystic fluid. Gallbladder wall 4mm. Liver enzymes WNL. HIDA scan positive for cholecystitis. (8/13) Laparoscopic cholecystectomy. (8/14) Patient is hemodynamically stable and ready for discharge home.    Patient to follow up in 1-2 weeks with Dr. Bashir as post op check after her surgery. 72F no significant PMH s/p tubal ligation, self reported hx of reflux p/w epigastric abdominal pain of 12 hours, associated with nausea and vomiting x5. Patient reports history of pain after eating large meals, usually 1-2 hours, but milder and shorter lasting than current episode. Reports she has had reflux before but does not take acid reducing medications. Denies fevers, chills, constipation, diarrhea, hematemesis, melena. In the ED, labs notable for WBC 14.59, RUQ US without Gardner's sign or pericholecystic fluid. Gallbladder wall 4mm. Liver enzymes WNL. HIDA scan positive for cholecystitis. (8/13) Laparoscopic cholecystectomy. (8/14) Patient is hemodynamically stable and ready for discharge home.    Patient to follow up in 1-2 weeks with Dr. Bashir as post op check after her surgery.   72F no significant PMH s/p tubal ligation, self reported hx of reflux p/w epigastric abdominal pain of 12 hours, associated with nausea and vomiting x5. Patient reports history of pain after eating large meals, usually 1-2 hours, but milder and shorter lasting than current episode. Reports she has had reflux before but does not take acid reducing medications. Denies fevers, chills, constipation, diarrhea, hematemesis, melena. In the ED, labs notable for WBC 14.59, RUQ US without Gardner's sign or pericholecystic fluid. Gallbladder wall 4mm. Liver enzymes WNL. HIDA scan positive for cholecystitis. (8/13) Laparoscopic cholecystectomy. WBC downtrended and patient hemodynamically stable and ready for discharge home. Hypophosphatemia repleted prior to dc.     Patient to follow up in 1-2 weeks with Dr. Bashir as post op check after her surgery.

## 2022-08-14 NOTE — CHART NOTE - NSCHARTNOTEFT_GEN_A_CORE
Patient is s/p laparoscopic cholecystectomy and back on floor resting comfortably in bed with no complaints. Patient denies any pain aside from mild pain over incision sites. She denies any nausea or vomiting. Patient ate a little bit of apple sauce and had some broth which were both tolerated well. Patient denies passing gas or having a bowel movement but has been urinating normally.     General: well-appearing pleasantly interactive woman resting comfortably in bed in no acute distress  Abdominal: Soft, appropriately tender over incision sites, nondistended, without any guarding or rigidity  Incisions: clean dry and intact    Assessment: Patient is recovering appropriately from laparoscopic cholecystectomy with appropriate incisional tenderness, without any other complaints, and benign abdominal exam.   Plan: Continue to assess for bowel movements, gas, nausea, and vomiting

## 2022-08-15 ENCOUNTER — TRANSCRIPTION ENCOUNTER (OUTPATIENT)
Age: 72
End: 2022-08-15

## 2022-08-15 ENCOUNTER — NON-APPOINTMENT (OUTPATIENT)
Age: 72
End: 2022-08-15

## 2022-08-15 VITALS
RESPIRATION RATE: 18 BRPM | TEMPERATURE: 98 F | DIASTOLIC BLOOD PRESSURE: 68 MMHG | OXYGEN SATURATION: 94 % | SYSTOLIC BLOOD PRESSURE: 124 MMHG | HEART RATE: 75 BPM

## 2022-08-15 LAB
ALBUMIN SERPL ELPH-MCNC: 3.4 G/DL — SIGNIFICANT CHANGE UP (ref 3.3–5)
ALP SERPL-CCNC: 65 U/L — SIGNIFICANT CHANGE UP (ref 40–120)
ALT FLD-CCNC: 46 U/L — HIGH (ref 10–45)
ANION GAP SERPL CALC-SCNC: 10 MMOL/L — SIGNIFICANT CHANGE UP (ref 5–17)
AST SERPL-CCNC: 36 U/L — SIGNIFICANT CHANGE UP (ref 10–40)
BILIRUB SERPL-MCNC: 0.4 MG/DL — SIGNIFICANT CHANGE UP (ref 0.2–1.2)
BUN SERPL-MCNC: 21 MG/DL — SIGNIFICANT CHANGE UP (ref 7–23)
CALCIUM SERPL-MCNC: 8.7 MG/DL — SIGNIFICANT CHANGE UP (ref 8.4–10.5)
CHLORIDE SERPL-SCNC: 100 MMOL/L — SIGNIFICANT CHANGE UP (ref 96–108)
CO2 SERPL-SCNC: 30 MMOL/L — SIGNIFICANT CHANGE UP (ref 22–31)
CREAT SERPL-MCNC: 0.79 MG/DL — SIGNIFICANT CHANGE UP (ref 0.5–1.3)
EGFR: 80 ML/MIN/1.73M2 — SIGNIFICANT CHANGE UP
GLUCOSE SERPL-MCNC: 86 MG/DL — SIGNIFICANT CHANGE UP (ref 70–99)
HCT VFR BLD CALC: 41.5 % — SIGNIFICANT CHANGE UP (ref 34.5–45)
HGB BLD-MCNC: 13.5 G/DL — SIGNIFICANT CHANGE UP (ref 11.5–15.5)
MAGNESIUM SERPL-MCNC: 2.4 MG/DL — SIGNIFICANT CHANGE UP (ref 1.6–2.6)
MCHC RBC-ENTMCNC: 30.3 PG — SIGNIFICANT CHANGE UP (ref 27–34)
MCHC RBC-ENTMCNC: 32.5 GM/DL — SIGNIFICANT CHANGE UP (ref 32–36)
MCV RBC AUTO: 93 FL — SIGNIFICANT CHANGE UP (ref 80–100)
NRBC # BLD: 0 /100 WBCS — SIGNIFICANT CHANGE UP (ref 0–0)
PHOSPHATE SERPL-MCNC: 1.9 MG/DL — LOW (ref 2.5–4.5)
PLATELET # BLD AUTO: 268 K/UL — SIGNIFICANT CHANGE UP (ref 150–400)
POTASSIUM SERPL-MCNC: 3.6 MMOL/L — SIGNIFICANT CHANGE UP (ref 3.5–5.3)
POTASSIUM SERPL-SCNC: 3.6 MMOL/L — SIGNIFICANT CHANGE UP (ref 3.5–5.3)
PROT SERPL-MCNC: 7.2 G/DL — SIGNIFICANT CHANGE UP (ref 6–8.3)
RBC # BLD: 4.46 M/UL — SIGNIFICANT CHANGE UP (ref 3.8–5.2)
RBC # FLD: 13.4 % — SIGNIFICANT CHANGE UP (ref 10.3–14.5)
SODIUM SERPL-SCNC: 140 MMOL/L — SIGNIFICANT CHANGE UP (ref 135–145)
WBC # BLD: 13.36 K/UL — HIGH (ref 3.8–10.5)
WBC # FLD AUTO: 13.36 K/UL — HIGH (ref 3.8–10.5)

## 2022-08-15 PROCEDURE — 83690 ASSAY OF LIPASE: CPT

## 2022-08-15 PROCEDURE — 86850 RBC ANTIBODY SCREEN: CPT

## 2022-08-15 PROCEDURE — 76705 ECHO EXAM OF ABDOMEN: CPT

## 2022-08-15 PROCEDURE — 85027 COMPLETE CBC AUTOMATED: CPT

## 2022-08-15 PROCEDURE — 84484 ASSAY OF TROPONIN QUANT: CPT

## 2022-08-15 PROCEDURE — C9399: CPT

## 2022-08-15 PROCEDURE — 86901 BLOOD TYPING SEROLOGIC RH(D): CPT

## 2022-08-15 PROCEDURE — 84100 ASSAY OF PHOSPHORUS: CPT

## 2022-08-15 PROCEDURE — 36415 COLL VENOUS BLD VENIPUNCTURE: CPT

## 2022-08-15 PROCEDURE — 96376 TX/PRO/DX INJ SAME DRUG ADON: CPT

## 2022-08-15 PROCEDURE — C1889: CPT

## 2022-08-15 PROCEDURE — 85610 PROTHROMBIN TIME: CPT

## 2022-08-15 PROCEDURE — 96375 TX/PRO/DX INJ NEW DRUG ADDON: CPT

## 2022-08-15 PROCEDURE — 88304 TISSUE EXAM BY PATHOLOGIST: CPT

## 2022-08-15 PROCEDURE — 83735 ASSAY OF MAGNESIUM: CPT

## 2022-08-15 PROCEDURE — 85025 COMPLETE CBC W/AUTO DIFF WBC: CPT

## 2022-08-15 PROCEDURE — G0378: CPT

## 2022-08-15 PROCEDURE — 80048 BASIC METABOLIC PNL TOTAL CA: CPT

## 2022-08-15 PROCEDURE — 85730 THROMBOPLASTIN TIME PARTIAL: CPT

## 2022-08-15 PROCEDURE — 86900 BLOOD TYPING SEROLOGIC ABO: CPT

## 2022-08-15 PROCEDURE — 86803 HEPATITIS C AB TEST: CPT

## 2022-08-15 PROCEDURE — U0003: CPT

## 2022-08-15 PROCEDURE — U0005: CPT

## 2022-08-15 PROCEDURE — 80053 COMPREHEN METABOLIC PANEL: CPT

## 2022-08-15 PROCEDURE — A9537: CPT

## 2022-08-15 PROCEDURE — 99284 EMERGENCY DEPT VISIT MOD MDM: CPT | Mod: 25

## 2022-08-15 PROCEDURE — 78227 HEPATOBIL SYST IMAGE W/DRUG: CPT | Mod: MA

## 2022-08-15 PROCEDURE — 96365 THER/PROPH/DIAG IV INF INIT: CPT

## 2022-08-15 RX ORDER — ACETAMINOPHEN 500 MG
3 TABLET ORAL
Qty: 0 | Refills: 0 | DISCHARGE
Start: 2022-08-15

## 2022-08-15 RX ORDER — SODIUM,POTASSIUM PHOSPHATES 278-250MG
1 POWDER IN PACKET (EA) ORAL ONCE
Refills: 0 | Status: COMPLETED | OUTPATIENT
Start: 2022-08-15 | End: 2022-08-15

## 2022-08-15 RX ADMIN — Medication 975 MILLIGRAM(S): at 05:40

## 2022-08-15 RX ADMIN — PIPERACILLIN AND TAZOBACTAM 25 GRAM(S): 4; .5 INJECTION, POWDER, LYOPHILIZED, FOR SOLUTION INTRAVENOUS at 05:41

## 2022-08-15 RX ADMIN — Medication 975 MILLIGRAM(S): at 00:23

## 2022-08-15 RX ADMIN — Medication 1 PACKET(S): at 09:12

## 2022-08-15 RX ADMIN — ENOXAPARIN SODIUM 40 MILLIGRAM(S): 100 INJECTION SUBCUTANEOUS at 02:50

## 2022-08-15 RX ADMIN — Medication 975 MILLIGRAM(S): at 06:15

## 2022-08-15 NOTE — DISCHARGE NOTE NURSING/CASE MANAGEMENT/SOCIAL WORK - NSDCPEFALRISK_GEN_ALL_CORE
For information on Fall & Injury Prevention, visit: [email or web address] OR  [email or web address] OR  https://www.cdc.gov/steadi/patient.html

## 2022-08-15 NOTE — DISCHARGE NOTE NURSING/CASE MANAGEMENT/SOCIAL WORK - PATIENT PORTAL LINK FT
You can access the FollowMyHealth Patient Portal offered by [facility] by registering at the following website: [email or web address]. By joining [employer]’s FollowMyHealth portal, you will also be able to view your health information using other applications (apps) compatible with our system.

## 2022-08-15 NOTE — PROGRESS NOTE ADULT - ASSESSMENT
72F no significant PMH s/p tubal ligation, self reported hx of reflux p/w epigastric abdominal pain of 12 hours, associated with nausea and vomiting x5 with US equivocal for cholecystitis however HIDA positive. s/p lap cholecystectomy (8/13)    PLAN:  - Diet: reg (tolerating)  - Zosyn  - Pain control  - f/u AM labs  - DVT ppx: Lovenox     ACS  [id number]     72F no significant PMH s/p tubal ligation, self reported hx of reflux p/w epigastric abdominal pain of 12 hours, associated with nausea and vomiting x5 with US equivocal for cholecystitis however HIDA positive. s/p lap cholecystectomy (8/13)    PLAN:  - F/u am labs  - Diet: regular diet  - Zosyn  - Pain control  - DVT ppx: Lovenox   - Dc planning this am pending [facility]    ACS  [id number]

## 2022-08-15 NOTE — DISCHARGE NOTE NURSING/CASE MANAGEMENT/SOCIAL WORK - NSFLUVACAGEDISCH_IMM_ALL_CORE
Procedure Date:  2022    Patient: Gopal Guillaume  : 1969    Sedation: MAC    Outpatient History & Physical Review for Colonoscopy     Indications: Stool DNA test Positive for CRC screening    Family History of Colon Cancer or Colon Polyps: No    Current Outpatient Medications   Medication Sig Dispense Refill   • Multiple Vitamin (MULTIVITAMIN ADULT PO) Take by mouth daily.     • sertraline (ZOLOFT) 100 MG tablet Take 1 tablet by mouth daily. 90 tablet 3   • electrolyte/PEG 3350 (Nulytely with Flavor Packs) 420 g solution Take as directed per Colonoscopy prep letter instructions 4000 mL 0   • varenicline (CHANTIX) 0.5 MG tablet Take 1 tablet daily for 3 days then 1 tablet twice daily for 4 more days.  On day 8 quit smoking and begin 1 mg tablet twice daily 11 tablet 0   • varenicline (CHANTIX) 1 MG tablet Take 1 tablet by mouth 2 times daily. Start after completing 1 week of 0.5 mg tablet 60 tablet 2     No current facility-administered medications for this encounter.       ALLERGIES:  Patient has no known allergies.            Past Medical History:   Diagnosis Date   • Anxiety    • Overweight (BMI 25.0-29.9)    • Tobacco use      Past Surgical History:   Procedure Laterality Date   • Extraocular muscle surg proc unlisted  1972    Strabismus surg bilaterally         PHYSICAL EXAM:  HEENT: Within normal limits  LUNGS: Lungs clear to auscultation. No cold symptoms present.  HEART: S1,S2, regular rate and rhythm no murmer.  NEUROLOGICAL: Within normal limits.  MENTAL STATUS: Alert, oriented x 3, interactive.  SKIN: warm, dry and intact, no lesions or rashes.   : N\A    The risks of the procedure including the possibility of bleeding, perforation (possible resulting in laparotomy/colostomy) aspiration, and the risk of a polypectomy were discussed with the patient who accepts these risks.             Adult

## 2022-08-15 NOTE — PROGRESS NOTE ADULT - SUBJECTIVE AND OBJECTIVE BOX
ACS DAILY PROGRESS NOTE:       SUBJECTIVE/ROS: No acute events overnight         MEDICATIONS  (STANDING):  acetaminophen     Tablet .. 975 milliGRAM(s) Oral every 6 hours  enoxaparin Injectable 40 milliGRAM(s) SubCutaneous every 24 hours  piperacillin/tazobactam IVPB.. 3.375 Gram(s) IV Intermittent every 8 hours    MEDICATIONS  (PRN):  oxyCODONE    IR 5 milliGRAM(s) Oral every 4 hours PRN Moderate Pain (4 - 6)      OBJECTIVE:    Vital Signs Last 24 Hrs  T(C): 37.3 (15 Aug 2022 01:27), Max: 37.3 (15 Aug 2022 01:27)  T(F): 99.2 (15 Aug 2022 01:27), Max: 99.2 (15 Aug 2022 01:27)  HR: 76 (15 Aug 2022 01:27) (76 - 88)  BP: 118/68 (15 Aug 2022 01:27) (118/68 - 135/69)  BP(mean): --  RR: 18 (15 Aug 2022 01:27) (18 - 18)  SpO2: 95% (15 Aug 2022 01:27) (92% - 97%)    Parameters below as of 15 Aug 2022 01:27  Patient On (Oxygen Delivery Method): room air            I&O's Detail    13 Aug 2022 07:01  -  14 Aug 2022 07:00  --------------------------------------------------------  IN:    IV PiggyBack: 100 mL    IV PiggyBack: 200 mL    Lactated Ringers: 600 mL  Total IN: 900 mL    OUT:    Oral Fluid: 0 mL    Voided (mL): 500 mL  Total OUT: 500 mL    Total NET: 400 mL      14 Aug 2022 07:01  -  15 Aug 2022 04:32  --------------------------------------------------------  IN:    IV PiggyBack: 200 mL    Oral Fluid: 1260 mL  Total IN: 1460 mL    OUT:  Total OUT: 0 mL    Total NET: 1460 mL          Daily     Daily     LABS:                        13.4   22.95 )-----------( 241      ( 14 Aug 2022 13:07 )             40.4     08-14    136  |  97  |  19  ----------------------------<  103<H>  3.2<L>   |  28  |  0.84    Ca    8.2<L>      14 Aug 2022 07:12  Phos  2.4     08-14  Mg     2.1     08-14    TPro  6.9  /  Alb  3.4  /  TBili  0.4  /  DBili  x   /  AST  55<H>  /  ALT  55<H>  /  AlkPhos  58  08-14    PT/INR - ( 13 Aug 2022 07:22 )   PT: 13.7 sec;   INR: 1.19 ratio         PTT - ( 13 Aug 2022 07:22 )  PTT:29.2 sec                     ACS DAILY PROGRESS NOTE:       SUBJECTIVE/ROS: No acute events overnight. Pt seen and examined. Feels well. Abd pain controlled. Tolerating breakfast. Denies n/v. Eager to go home         MEDICATIONS  (STANDING):  acetaminophen     Tablet .. 975 milliGRAM(s) Oral every 6 hours  enoxaparin Injectable 40 milliGRAM(s) SubCutaneous every 24 hours  piperacillin/tazobactam IVPB.. 3.375 Gram(s) IV Intermittent every 8 hours    MEDICATIONS  (PRN):  oxyCODONE    IR 5 milliGRAM(s) Oral every 4 hours PRN Moderate Pain (4 - 6)      OBJECTIVE:    Vital Signs Last 24 Hrs  T(C): 37.3 (15 Aug 2022 01:27), Max: 37.3 (15 Aug 2022 01:27)  T(F): 99.2 (15 Aug 2022 01:27), Max: 99.2 (15 Aug 2022 01:27)  HR: 76 (15 Aug 2022 01:27) (76 - 88)  BP: 118/68 (15 Aug 2022 01:27) (118/68 - 135/69)  BP(mean): --  RR: 18 (15 Aug 2022 01:27) (18 - 18)  SpO2: 95% (15 Aug 2022 01:27) (92% - 97%)    Parameters below as of 15 Aug 2022 01:27  Patient On (Oxygen Delivery Method): room air            I&O's Detail    13 Aug 2022 07:01  -  14 Aug 2022 07:00  --------------------------------------------------------  IN:    IV PiggyBack: 100 mL    IV PiggyBack: 200 mL    Lactated Ringers: 600 mL  Total IN: 900 mL    OUT:    Oral Fluid: 0 mL    Voided (mL): 500 mL  Total OUT: 500 mL    Total NET: 400 mL      14 Aug 2022 07:01  -  15 Aug 2022 04:32  --------------------------------------------------------  IN:    IV PiggyBack: 200 mL    Oral Fluid: 1260 mL  Total IN: 1460 mL    OUT:  Total OUT: 0 mL    Total NET: 1460 mL          Daily     Daily     LABS:                        13.4   22.95 )-----------( 241      ( 14 Aug 2022 13:07 )             40.4     08-14    136  |  97  |  19  ----------------------------<  103<H>  3.2<L>   |  28  |  0.84    Ca    8.2<L>      14 Aug 2022 07:12  Phos  2.4     08-14  Mg     2.1     08-14    TPro  6.9  /  Alb  3.4  /  TBili  0.4  /  DBili  x   /  AST  55<H>  /  ALT  55<H>  /  AlkPhos  58  08-14    PT/INR - ( 13 Aug 2022 07:22 )   PT: 13.7 sec;   INR: 1.19 ratio         PTT - ( 13 Aug 2022 07:22 )  PTT:29.2 sec          Physical Exam  General: sitting up in bed in nad  HEENT: ncat, trachea midline  Chest: respirations non labored  Gastrointestinal: soft non tender non distended +steris c/d/i  Extremities: wwp  Neurological: awake alert appropriate

## 2022-08-19 LAB — SURGICAL PATHOLOGY STUDY: SIGNIFICANT CHANGE UP

## 2022-09-20 ENCOUNTER — TRANSCRIPTION ENCOUNTER (OUTPATIENT)
Age: 72
End: 2022-09-20

## 2022-09-20 ENCOUNTER — APPOINTMENT (OUTPATIENT)
Dept: UROGYNECOLOGY | Facility: CLINIC | Age: 72
End: 2022-09-20

## 2022-09-20 DIAGNOSIS — R39.15 URGENCY OF URINATION: ICD-10-CM

## 2022-09-20 DIAGNOSIS — N81.11 CYSTOCELE, MIDLINE: ICD-10-CM

## 2022-09-20 DIAGNOSIS — R35.0 FREQUENCY OF MICTURITION: ICD-10-CM

## 2022-09-20 DIAGNOSIS — N39.41 URGE INCONTINENCE: ICD-10-CM

## 2022-09-20 NOTE — PHYSICAL EXAM
[Chaperone Present] : A chaperone was present in the examining room during all aspects of the physical examination [FreeTextEntry1] : General: Well, appearing. Alert and orientated. No acute distress\par HEENT: Normocephalic, atraumatic and extraocular muscles appear to be intact \par Neck: Full range of motion, no obvious lymphadenopathy, deformities, or masses noted \par Respiratory: Speaking in full sentences comfortably, normal work of breathing and no cough during visit\par Musculoskeletal: active full range of motion in extremities \par Extremities: No upper extremity edema noted\par Skin: no obvious rash or skin lesions\par Neuro: Orientated X 3, speech is fluent, normal rate\par Psych: Normal mood and affect

## 2022-09-20 NOTE — HISTORY OF PRESENT ILLNESS
[FreeTextEntry1] : Angelina is a 73yo with urgency, frequency, UUI, fecal incontinence and prolapse who presents for follow up. She was last seen 7/19/22 and we discussed behavioral and fluid modifications for her OAB symptoms. At the time, she was consuming 4-5 16.9oz water bottles per day and 32oz tea. She reported 3-4 episodes of nocturia and varying daytime frequency. She now reports +_____. \par \par For her cystocele, she was interested in pursuing pelvic floor PT and she reports ___.\par \par For her fecal incontinence, she started ___. She was recommended to undergo GI consult.

## 2022-09-20 NOTE — DISCUSSION/SUMMARY
[FreeTextEntry1] : 1. UUI/Urgency/Frequency\par - Continue with behavioral and fluid modification\par - Add ___\par \par 2. Fecal incontinence\par - Continue with ___\par \par 3. Midline cystocele\par - Continue with PT

## 2022-11-05 ENCOUNTER — APPOINTMENT (OUTPATIENT)
Dept: INTERNAL MEDICINE | Facility: CLINIC | Age: 72
End: 2022-11-05

## 2022-11-05 VITALS
HEART RATE: 62 BPM | WEIGHT: 138 LBS | OXYGEN SATURATION: 97 % | BODY MASS INDEX: 26.95 KG/M2 | SYSTOLIC BLOOD PRESSURE: 130 MMHG | DIASTOLIC BLOOD PRESSURE: 70 MMHG

## 2022-11-05 DIAGNOSIS — Z23 ENCOUNTER FOR IMMUNIZATION: ICD-10-CM

## 2022-11-05 PROCEDURE — 99213 OFFICE O/P EST LOW 20 MIN: CPT | Mod: 25

## 2022-11-05 PROCEDURE — 90715 TDAP VACCINE 7 YRS/> IM: CPT

## 2022-11-05 PROCEDURE — 90471 IMMUNIZATION ADMIN: CPT

## 2022-11-05 NOTE — HISTORY OF PRESENT ILLNESS
[FreeTextEntry8] : Pt reports cholecystectomy due to gall bladder infection in 8/22. Feels well.\par Also needs to fill out the form and Tdap

## 2023-01-06 NOTE — PRE-ANESTHESIA EVALUATION ADULT - NSANTHOSAYNRD_GEN_A_CORE
Implemented All Universal Safety Interventions:  [place] to call system. Call bell, personal items and telephone within reach. Instruct patient to call for assistance. Room bathroom lighting operational. Non-slip footwear when patient is off stretcher. Physically safe environment: no spills, clutter or unnecessary equipment. Stretcher in lowest position, wheels locked, appropriate side rails in place.
No. LALITA screening performed.  STOP BANG Legend: 0-2 = LOW Risk; 3-4 = INTERMEDIATE Risk; 5-8 = HIGH Risk

## 2023-01-18 ENCOUNTER — APPOINTMENT (OUTPATIENT)
Dept: INTERNAL MEDICINE | Facility: CLINIC | Age: 73
End: 2023-01-18
Payer: MEDICARE

## 2023-01-18 VITALS
HEIGHT: 60 IN | DIASTOLIC BLOOD PRESSURE: 66 MMHG | WEIGHT: 136 LBS | SYSTOLIC BLOOD PRESSURE: 122 MMHG | OXYGEN SATURATION: 98 % | TEMPERATURE: 96.9 F | HEART RATE: 78 BPM | BODY MASS INDEX: 26.7 KG/M2

## 2023-01-18 PROCEDURE — 99214 OFFICE O/P EST MOD 30 MIN: CPT

## 2023-01-19 NOTE — HISTORY OF PRESENT ILLNESS
[FreeTextEntry8] : Feeling pressure in the head at night at times like BP was going up. BP was 155/80. \par Pt under a lot of stress and very anxious.No c/p,palp.SOB. No dizziness,headache.

## 2023-02-09 ENCOUNTER — APPOINTMENT (OUTPATIENT)
Dept: ENDOCRINOLOGY | Facility: CLINIC | Age: 73
End: 2023-02-09

## 2023-02-11 ENCOUNTER — APPOINTMENT (OUTPATIENT)
Dept: INTERNAL MEDICINE | Facility: CLINIC | Age: 73
End: 2023-02-11
Payer: MEDICARE

## 2023-02-11 VITALS
SYSTOLIC BLOOD PRESSURE: 128 MMHG | OXYGEN SATURATION: 97 % | HEIGHT: 60 IN | DIASTOLIC BLOOD PRESSURE: 64 MMHG | WEIGHT: 137 LBS | BODY MASS INDEX: 26.9 KG/M2 | HEART RATE: 72 BPM | TEMPERATURE: 97.1 F

## 2023-02-11 DIAGNOSIS — F41.9 ANXIETY DISORDER, UNSPECIFIED: ICD-10-CM

## 2023-02-11 DIAGNOSIS — R00.2 PALPITATIONS: ICD-10-CM

## 2023-02-11 DIAGNOSIS — R03.0 ELEVATED BLOOD-PRESSURE READING, W/OUT DIAGNOSIS OF HYPERTENSION: ICD-10-CM

## 2023-02-11 PROCEDURE — 99213 OFFICE O/P EST LOW 20 MIN: CPT

## 2023-02-11 NOTE — HISTORY OF PRESENT ILLNESS
[FreeTextEntry1] : f/u for BP check [de-identified] : Pt reports feeling well today\par BP at home was 140/82- 136/78.\par Pt still feeling anxious when she has palp.and rushing blood in head at night.\par but no C/P, SOB,HEADACHE\par \par CARDIAC WORK UP ON 6/16/22 WAS WNL.

## 2023-04-11 ENCOUNTER — APPOINTMENT (OUTPATIENT)
Dept: ORTHOPEDIC SURGERY | Facility: CLINIC | Age: 73
End: 2023-04-11
Payer: MEDICARE

## 2023-04-11 VITALS
DIASTOLIC BLOOD PRESSURE: 81 MMHG | HEIGHT: 60 IN | OXYGEN SATURATION: 97 % | SYSTOLIC BLOOD PRESSURE: 142 MMHG | TEMPERATURE: 98 F | HEART RATE: 72 BPM | BODY MASS INDEX: 26.7 KG/M2 | WEIGHT: 136 LBS

## 2023-04-11 DIAGNOSIS — M21.861 OTHER SPECIFIED ACQUIRED DEFORMITIES OF RIGHT LOWER LEG: ICD-10-CM

## 2023-04-11 DIAGNOSIS — M67.88 OTHER SPECIFIED DISORDERS OF SYNOVIUM AND TENDON, OTHER SITE: ICD-10-CM

## 2023-04-11 DIAGNOSIS — M25.571 PAIN IN RIGHT ANKLE AND JOINTS OF RIGHT FOOT: ICD-10-CM

## 2023-04-11 PROCEDURE — 73630 X-RAY EXAM OF FOOT: CPT | Mod: RT

## 2023-04-11 PROCEDURE — 99214 OFFICE O/P EST MOD 30 MIN: CPT

## 2023-04-11 PROCEDURE — 73610 X-RAY EXAM OF ANKLE: CPT | Mod: RT

## 2023-06-23 ENCOUNTER — APPOINTMENT (OUTPATIENT)
Dept: ORTHOPEDIC SURGERY | Facility: CLINIC | Age: 73
End: 2023-06-23
Payer: MEDICARE

## 2023-06-23 DIAGNOSIS — M25.512 PAIN IN LEFT SHOULDER: ICD-10-CM

## 2023-06-23 DIAGNOSIS — M92.60 JUVENILE OSTEOCHONDROSIS OF TARSUS, UNSPECIFIED ANKLE: ICD-10-CM

## 2023-06-23 DIAGNOSIS — M67.88 OTHER SPECIFIED DISORDERS OF SYNOVIUM AND TENDON, OTHER SITE: ICD-10-CM

## 2023-06-23 DIAGNOSIS — M77.8 OTHER ENTHESOPATHIES, NOT ELSEWHERE CLASSIFIED: ICD-10-CM

## 2023-06-23 PROCEDURE — 99213 OFFICE O/P EST LOW 20 MIN: CPT

## 2023-06-23 NOTE — HISTORY OF PRESENT ILLNESS
[Sneakers] : janet [FreeTextEntry1] : 73 year female presents for evaluation of R Achilles tendon/ankle pain [id number]. Pt denies any recent injuries to R foot/ankle. Pt states the pain is on lateral aspect of R ankle and R Achilles tendon. Pt states the pain is intermittent and currently has minimal pain. Pt takes Tylenol for pain prn. Pt denies any numbness/tingling. Denies additional musculoskeletal complaints referable to foot/ankle. Denies hx of DM, RA or smoking.\par

## 2023-06-23 NOTE — PHYSICAL EXAM
[Normal] : Oriented to person, place, and time, insight and judgement were intact and the affect was normal [de-identified] : Extremity: +Equinus (releases) R LE, residual soft tissue swelling and interval decreased tenderness R distal Achilles / insertional, Garcia testing normal R LE.  Nontender R ankle, peroneals, syndesmosis, hindfoot ST, midfoot LF and PTT insertional, and forefoot.  Stable Drawer testing R ankle, 5 / 5 evertor strength R ankle, calves soft, sensorimotor unchanged, skin intact B LE.  AOx3, mood / affect normal. [de-identified] : MAX, NAD

## 2023-06-23 NOTE — DISCUSSION/SUMMARY
[de-identified] : Options reviewed, NB shoe wear, gel heel cup, activity modification, physical therapy / rehabilitation and associated modalities, calf stretching exercises and HEP, referral Dr. Carlson for assessment chronic pain L shoulder.  Return to office in 2 - 3 months / PRN, all questions answered.

## 2023-06-26 ENCOUNTER — APPOINTMENT (OUTPATIENT)
Dept: ORTHOPEDIC SURGERY | Facility: CLINIC | Age: 73
End: 2023-06-26

## 2023-06-29 ENCOUNTER — LABORATORY RESULT (OUTPATIENT)
Age: 73
End: 2023-06-29

## 2023-06-29 ENCOUNTER — APPOINTMENT (OUTPATIENT)
Dept: INTERNAL MEDICINE | Facility: CLINIC | Age: 73
End: 2023-06-29
Payer: MEDICARE

## 2023-06-29 VITALS
HEIGHT: 60 IN | DIASTOLIC BLOOD PRESSURE: 85 MMHG | OXYGEN SATURATION: 97 % | HEART RATE: 71 BPM | TEMPERATURE: 97.8 F | SYSTOLIC BLOOD PRESSURE: 140 MMHG | BODY MASS INDEX: 27.48 KG/M2 | WEIGHT: 140 LBS

## 2023-06-29 DIAGNOSIS — E55.9 VITAMIN D DEFICIENCY, UNSPECIFIED: ICD-10-CM

## 2023-06-29 DIAGNOSIS — R15.1 FECAL SMEARING: ICD-10-CM

## 2023-06-29 DIAGNOSIS — Z63.5 DISRUPTION OF FAMILY BY SEPARATION AND DIVORCE: ICD-10-CM

## 2023-06-29 DIAGNOSIS — Z87.19 PERSONAL HISTORY OF OTHER DISEASES OF THE DIGESTIVE SYSTEM: ICD-10-CM

## 2023-06-29 DIAGNOSIS — Z12.11 ENCOUNTER FOR SCREENING FOR MALIGNANT NEOPLASM OF COLON: ICD-10-CM

## 2023-06-29 DIAGNOSIS — K59.09 OTHER CONSTIPATION: ICD-10-CM

## 2023-06-29 DIAGNOSIS — R10.13 EPIGASTRIC PAIN: ICD-10-CM

## 2023-06-29 DIAGNOSIS — Z91.018 ALLERGY TO OTHER FOODS: ICD-10-CM

## 2023-06-29 DIAGNOSIS — Z87.898 PERSONAL HISTORY OF OTHER SPECIFIED CONDITIONS: ICD-10-CM

## 2023-06-29 PROCEDURE — 82270 OCCULT BLOOD FECES: CPT

## 2023-06-29 PROCEDURE — 99205 OFFICE O/P NEW HI 60 MIN: CPT | Mod: 25

## 2023-06-29 PROCEDURE — 83014 H PYLORI DRUG ADMIN: CPT

## 2023-06-29 RX ORDER — PSYLLIUM SEED
58.6 PACKET (EA) ORAL
Qty: 1 | Refills: 3 | Status: ACTIVE | COMMUNITY
Start: 2023-06-29 | End: 1900-01-01

## 2023-06-29 SDOH — SOCIAL STABILITY - SOCIAL INSECURITY: DISRUPTION OF FAMILY BY SEPARATION AND DIVORCE: Z63.5

## 2023-06-29 NOTE — ASSESSMENT
[FreeTextEntry1] : 1  Fiber  is a substance found in plants. Dietary fiber is a type of carbohydrate we eat. Eating the right amount of fiber has been shown to have a range of health benefits. It helps you feel full longer, which can curb overeating and weight gain. Eating fiber-rich foods aids in digestion  and the absorption of nutrients.\par \par Foods that are high in fiber can help treat certain issues. These include constipation, irritable bowel  syndrome (IBS), and diverticulitis. Dietary fiber can reduce your risk of coronary heart  disease, type 2 diabetes, and some cancers. It also may lower your cholesterol.\par \par Path to improved health\par \par The amount of fiber you should get from your daily diet  depends on your age and gender. Men 50 years of age and younger should consume at least 38 grams of fiber per day. Men older than 50 years of age should get at least 30 grams of fiber daily. Women 50 years of age and younger should consume at least 25 grams of fiber per day. Women older than 50 years of age should get at least 21 grams of fiber daily.\par \par The following changes can increase the fiber in your diet.\par 1.Eat at least 2 cups of fruits and 2 1/2 cups of vegetables each day. This can include:\par •1 artichoke\par •1 medium sweet potato\par •1 small pear\par •½ cup of green peas\par •½ cup of berries, such as raspberries or blackberries\par •½ cup of prunes\par •¼ cup figs or dates\par •½ cup of spinach\par •1 medium apple\par •1 medium orange.\par 2.Replace refined white bread with whole-grain breads and cereals. Choose brown rice instead of white rice. Eat the following foods:\par •100% whole-wheat bread\par •oatmeal\par •brown rice\par •bran muffins\par •bran or multiple-grain cereals, cooked or dry\par •popcorn (unbuttered)\par •almonds.\par 3.Check nutrition fact labels for the amount of dietary fiber. Try to get 5 grams of fiber per serving.\par 4.Add ¼ cup of wheat bran (woo’s bran) to foods. You can put it in cooked cereal, applesauce, or meat loaf.\par 5.Eat ½ cup cooked beans, such as navy, kidney, ambriz, black, lima, or white beans.\par \par Things to consider\par \par Try not to add too much fiber to your diet at once. You may get symptoms such as bloating, cramping, or gas. You can prevent these by increasing your fiber slowly. Start with one of the changes listed above. Wait several days to a week before making another. If one change doesn’t seem to work for you, try a different one. Be sure to drink more fluids as you increase the amount of fiber you eat. Fluids help your body digest fiber. Try to drink 8 glasses a day. Choose no- or low-calorie beverages, such as water, unsweetened tea, or diet soda.  Fiber  is a substance found in plants. Dietary fiber is a type of carbohydrate we eat. Eating the right amount of fiber has been shown to have a range of health benefits. It helps you feel full longer, which can curb overeating and weight gain. Eating fiber-rich foods aids in digestion  and the absorption of nutrients.\par \par Foods that are high in fiber can help treat certain issues. These include constipation, irritable bowel  syndrome (IBS), and diverticulitis. Dietary fiber can reduce your risk of coronary heart  disease, type 2 diabetes, and some cancers. It also may lower your cholesterol.\par \par Path to improved health\par \par The amount of fiber you should get from your daily diet  depends on your age and gender. Men 50 years of age and younger should consume at least 38 grams of fiber per day. Men older than 50 years of age should get at least 30 grams of fiber daily. Women 50 years of age and younger should consume at least 25 grams of fiber per day. Women older than 50 years of age should get at least 21 grams of fiber daily.\par \par The following changes can increase the fiber in your diet.\par 1.Eat at least 2 cups of fruits and 2 1/2 cups of vegetables each day. This can include:\par •1 artichoke\par •1 medium sweet potato\par •1 small pear\par •½ cup of green peas\par •½ cup of berries, such as raspberries or blackberries\par •½ cup of prunes\par •¼ cup figs or dates\par •½ cup of spinach\par •1 medium apple\par •1 medium orange.\par 2.Replace refined white bread with whole-grain breads and cereals. Choose brown rice instead of white rice. Eat the following foods:\par •100% whole-wheat bread\par •oatmeal\par •brown rice\par •bran muffins\par •bran or multiple-grain cereals, cooked or dry\par •popcorn (unbuttered)\par •almonds.\par 3.Check nutrition fact labels for the amount of dietary fiber. Try to get 5 grams of fiber per serving.\par 4.Add ¼ cup of wheat bran (woo’s bran) to foods. You can put it in cooked cereal, applesauce, or meat loaf.\par 5.Eat ½ cup cooked beans, such as navy, kidney, ambriz, black, lima, or white beans.\par \par Things to consider\par \par Try not to add too much fiber to your diet at once. You may get symptoms such as bloating, cramping, or gas. You can prevent these by increasing your fiber slowly. Start with one of the changes listed above. Wait several days to a week before making another. If one change doesn’t seem to work for you, try a different one. Be sure to drink more fluids as you increase the amount of fiber you eat. Fluids help your body digest fiber. Try to drink 8 glasses a day. Choose no- or low-calorie beverages, such as water, unsweetened tea, or diet soda.\par 2  colon cancer screening   WE discussed procedure and will return prior for blood testing and instruction.  Colon and rectal cancer screening is a way in which doctors check the colon and rectum for signs of cancer or growths (called polyps) that might become cancer. It is done in people who have no symptoms and no reason to think they have cancer. The goal is to find and remove polyps before they become cancer, or to find cancer early, before it grows, spreads, or causes problems.\par \par The colon and rectum are the last part of the digestive tract (figure 1). When doctors talk about colon and rectal cancer screening, they use the term "colorectal." That is just a shorter way of saying "colon and rectal." It's also possible to say just colon cancer screening.\par \par Studies show that having colon cancer screening lowers the chance of dying from colon cancer. There are several different types of screening test that can do this.\par \par What are the different screening tests for colon cancer? — They include:\par \par ?Colonoscopy – Colonoscopy allows the doctor to see directly inside the entire colon. Before you can have a colonoscopy, you must clean out your colon. You do this at home by drinking a special liquid that causes watery diarrhea for several hours. On the day of the test, you get medicine to help you relax. Then a doctor puts a thin tube into your anus and advances it into your colon (figure 2). The tube has a tiny camera attached to it, so the doctor can see inside your colon. The tube also has tiny tools on the end, so the doctor can remove pieces of tissue or polyps if they are there. After polyps or pieces of tissue are removed, they are sent to a lab to be checked for cancer.\par \par \par •Advantages of this test – Colonoscopy finds most small polyps and almost all large polyps and cancers. If found, polyps can be removed right away. This test gives the most accurate results. If any other screening tests are done first and come back positive, a colonoscopy will need to be done for follow-up. If you have a colonoscopy as your first test, you will probably not need a second follow-up test soon after.\par \par \par •Drawbacks to this test – Colonoscopy has some small risks. It can cause bleeding or tear the inside of the colon, but this only happens in 1 out of 1,000 people. Also, cleaning out the bowel beforehand can be unpleasant. Plus, people usually cannot work or drive for the rest of the day after the test, because of the relaxation medicine they must take during the test.\par \par \par Sigmoidoscopy – A sigmoidoscopy is similar to a colonoscopy. The difference is that this test looks only at the last part of the colon, and a colonoscopy looks at the whole colon. Before you have a sigmoidoscopy, you must clean out the lower part of your colon using an enema. This bowel cleaning is not as thorough or unpleasant as the one for colonoscopy. For this test, you do not need to take medicines to help you relax, so you can drive and work afterward if you want.\par \par \par •Advantages of this test – Sigmoidoscopy can find polyps and cancers in the rectum and the last part of the colon. If polyps are found, they can be removed right away.\par \par \par •Drawbacks to this test – In about 2 out of 10,000 people, sigmoidoscopy tears the inside of the colon. The test also can't find polyps or cancers that are in the part of the colon the test does not view (figure 3). If doctors find polyps or cancer during a sigmoidoscopy, they usually follow up with a colonoscopy.\par \par \par CT colonography (also known as virtual colonoscopy or CTC) – CTC looks for cancer and polyps using a special X-ray called a "CT scan." For most CTC tests, the preparation is the same as it is for colonoscopy.\par \par \par •Advantages of this test – CTC can find polyps and cancers in the whole colon without the need for medicines to relax.\par \par \par •Drawbacks to this test – If doctors find polyps or cancer with CTC, they usually follow up with a colonoscopy. CTC sometimes finds areas that look abnormal but that turn out to be healthy. This means that CTC can lead to tests and procedures you did not need. Plus, CTC exposes you to radiation. In most cases, the preparation needed to clean the bowel is the same as the one needed for a colonoscopy. The test is expensive, and some insurance companies might not cover this test for screening.\par \par \par Stool test for blood – "Stool" is another word for bowel movements. Stool tests most commonly check for blood in samples of stool. Cancers and polyps can bleed, and if they bleed around the time you do the stool test, then blood will show up on the test. The test can find even small amounts of blood that you can't see in your stool. Other less serious conditions can also cause small amounts of blood in the stool, and that will show up in this test. You will have to collect small samples from your bowel movements, which you will put in a special container you get from your doctor or nurse. Then you follow the instructions to mail the container out for the testing.\par \par \par •Advantages of this test – This test does not involve cleaning out the colon or having any procedures.\par \par \par •Drawbacks to this test – Stool tests are less likely to find polyps than other screening tests. These tests also often come up abnormal even in people who do not have cancer. If a stool test shows something abnormal, doctors usually follow up with a colonoscopy.\par \par \par Stool DNA test – The stool DNA test checks for genetic markers of cancer, as well as for signs of blood. For this test, you get a special kit in order to collect a whole bowel movement. Then you follow the instructions about how and where to ship it.\par \par \par •Advantages of this test – This test does not involve cleaning out the colon or having any procedures. When cancer is not present, it is less likely to be falsely abnormal than a stool test for blood. That means it leads to fewer unnecessary colonoscopies.\par \par \par •Drawbacks to this test – It might be unpleasant to collect and ship a whole bowel movement. If a DNA test shows something abnormal, doctors usually follow up with a colonoscopy.\par \par \par There is no blood test that most experts think is accurate enough to use for screening.\par \par How do I choose which test to have? — Work with your doctor or nurse to decide which test is best for you. Some doctors might choose to combine screening tests, for example, sigmoidoscopy plus stool testing for blood. Being screened–no matter how–is more important than which test you choose.\par \par Who should be screened for colon cancer? — Doctors recommend that most people begin having colon cancer screening at age 50. People who have an increased risk of getting colon cancer sometimes begin screening at a younger age. That might include people with a strong family history of colon cancer, and people with diseases of the colon called "Crohn's disease" and "ulcerative colitis."\par \par Most people can stop being screened around the age of 75, or at the latest 85.\par \par How often should I be screened? — That depends on your risk of colon cancer and which test you have. People who have a high risk of colon cancer often need to be tested more often and should have a colonoscopy.\par \par Most people are not at high risk, so they can choose one of these schedules:\par \par Colonoscopy every 10 years\par \par CT colonography (CTC) every 5 years\par \par Sigmoidoscopy every 5 to 10 years\par \par Stool testing for blood once a year\par \par Stool DNA testing every 3 years (but doctors are not yet sure of the best time frame)\par 3.hpn  \par DIETARY SALT (SODIUM); DASH DIET AND BLOOD PRESSURE:\par To decrease the sodium in your diet: \par · Use fresh vegetables and foods as much as possible.\par · Avoid canned and processed foods. Cured meats such as wilkerson, ham, and sausages are high in salt.\par · Try using different herbs and spices in your cooking instead of salt.\par · In restaurants, avoid foods with sauces, cheese, and cured meats. Ask for low-sodium choices.\par To get more potassium in your diet, eat:\par · Bananas, fresh or dried apricots, peaches, citrus fruits, melons\par · Cauliflower, broccoli, tomatoes, carrots, raw spinach, beet greens, potatoes\par To get more magnesium in your diet, eat:\par · Whole grain foods, leafy green vegetables, dried fruits\par • Fish and seafood, poultry \par To get more calcium in your diet, eat:\par · Nonfat milk, yogurt, and low-fat cheeses \par · Metaline and sardines\par · Cooked dried beans\par · Broccoli, kale, and bok kylie\par · Tofu or soybean curd\par DASH stands for "dietary approaches to stop hypertension." The DASH diet is low in total and saturated fat. It is rich in fruits, vegetables, and low-fat dairy foods. The diet allows you to get natural fiber, calcium, and magnesium from food. It prevents or lowers high blood pressure. It can also help lower cholesterol in your blood. \par Don't change how you eat all at once. It's much more likely that you'll succeed if you make only one or two small changes at a time. Wait until those changes are a habit, then make a couple more changes. Some good starting steps include: \par Add one serving of vegetables to your meals at lunch and dinner. This is an easy way to help you get more vegetables in your diet. \par Have a piece of fruit as an afternoon or after-dinner snack. One glass of juice at breakfast is not enough fruit in your diet. \par Use half your usual amount of butter, margarine, or salad dressing. \par Buy nonfat salad dressing or nonfat sour cream.\par Follow this guide to select your menu of meals. The number of calories we want you to eat each day will tell you how many servings you can choose from each food group.\par Calories: 1600 2100 2600 3100 Servings Grains 6 7 ½ 10 ½ 12 ½ Vegetables 	 4 4 ½ 5 6 Fruit 4 5 5 6 Dairy (low-fat) 2 ½ 3 3 3 ½ Meat, poultry, fish ½ 1 ½ 2 2 ½ Nuts, seeds ½ ½ ½ 1 Fats and sweets 1 ½ 2 ½ 3 4\par Grains and grain products like breads and cereals provide energy, fiber and vitamins. Whole grains have more of these nutrients. One serving equals one of the following:\par Bagel, 1/2 medium; Barley, cooked 1/2 cup; Biscuit, country style 1 medium; Bread, whole wheat, white 1 slice; Cereals, cold or cooked, 1/2 cup; Cornbread, 1 medium piece; Crackers, hakan, 2; Crackers, saltine, 4; Dinner roll, 1medium; English muffin, ½; Hamburger bun, ½; Muffin, 1 medium; Pancake, 1 medium; Pasta, 1/2 cup cooked; Daya, 1/2 large or 1 small; Popcorn, 1 cup popped; Pretzels, 1 ounce; Rice, white, brown, or wild, 1/2 cup cooked; Tortillas, corn or flour, 1 medium; Waffle, 1 medium; Wheat germ, 1/4 cup; \par Vegetables are rich sources of potassium, magnesium, and fiber. One serving is 1/2 cup of any of the following cooked vegetables:\par Asparagus, Beans (green, yellow), Beets, Broccoli, [place] Sprouts, Carrots, Cauliflower, [place], chicory, mustard and turnip (and other) greens, Corn, Kale, Lima beans, Mixed vegetables, Okra, Parsnips, Peas, green, Potatoes (1/2 medium or 1/2 cup mashed), Pumpkin, Rutabaga, Spaghetti or tomato sauce, Spinach, Squash (zucchini or yellow), Stewed tomatoes, Succotash, Sweet potatoes, Turnips, Yam \par Raw vegetables: Carrots,1/2 cup; Celery, 1/2 cup; Lettuce (to, loose-leaf, green-leaf), 1 cup; Peppers, 1/2 cup; Spinach, 1 cup; Tomato, 1/2\par Fruits and fruit juices are important sources of potassium and magnesium. Fruits also contain fiber and are low in sodium and fat. One serving equals:\par Any fruit juice, # cup (6 ounces); Canned or frozen fruit, ½ cup (includes applesauce); Dried fruit, ¼ cup; \par Fresh fruit:\par Apple, 1 medium; Apricots, 2 medium; Banana, 1 medium; Berries, 1/2 cup; Melon, 1 wedge, or 1/2 cup; Cherries, 10; Grapefruit, 1/2; Grapes, 15; Kiwi, 1 medium; [place], 1/2 small; Nectarine, 1 medium; Orange, 1 medium; Peach, 1 medium; Pear, 1 medium; Pineapple, 1/2 cup; Plums, 2 medium; Tangerine, 1 large\par Dairy foods provide protein and calcium. Use low-fat or nonfat dairy products to cut down on fat. One serving equals:\par Skim milk, 1 cup (8 ounces); 1% low fat milk, 1 cup (8 ounces); 2% low fat milk, 1 cup (8 ounces) nonfat dry milk powder (1/3 cup); Low-fat cottage cheese, 1 cup (8 ounces); Parmesan cheese, 1 tablespoon; Mozzarella cheese, part skim, 1/4 cup (1 ounce); Low-fat cheddar cheese, 11/2 ounces; Ricotta cheese, part skim milk or nonfat, 1/4 cup (11/2 ounces); Other low fat or nonfat cheeses (11/2 oz.); Low-fat or nonfat yogurt, fruit-flavored or plain, 1 cup (8 ounces)\par Low-fat or nonfat frozen yogurt, 1/2 cup (4 ounces); Note: People who can't digest dairy products can try taking lactase enzyme pills or drops (available at drug and grocery stores) when they eat dairy. There is also milk available with the enzyme already added. Or you can buy lactose-free milk.\par Meat, poultry, and fish are good sources of protein and magnesium. One serving equals:\par Lean meat including beef, veal, or pork, 3 ounces cooked; Skinless, white meat poultry including turkey, chicken, 3 ounces; Fish and shellfish, 3 ounces cooked; Low-fat luncheon meats, 1 ounce; Egg, 1 medium; Tofu, 3 ounces\par Note: Three ounces of cooked meat is about the size of a deck of cards.\par Nuts, seeds, and legumes are rich sources of energy, magnesium, potassium, protein and fiber. Nuts and seeds are also high in fat, so portions should be small.\par Almonds, 1/3 cup; Beans such as kidney, ambriz, and navy, 1/2 cup cooked; Chickpeas and lentils, 1/2 cup cooked; Cashews, 1/3 cup; Filberts, 1/3 cup; Mixed nuts, 1/3 cup; Peanut butter, 2 tablespoons; Peanuts, 1/3 cup; Sesame seeds, 2 tablespoons; Sunflower seeds, 2 tablespoons; Tofu, regular, 3 ounces; Walnuts, 1/3 cup \par Following the above diet will give you about 27% fat in your diet. The goal is to have 30% or less of the calories you eat each day be from fat. To meet that goal, do not eat more than 2-3 servings daily of added fat. Also try to limit sweets. One serving equals:\par Butter or margarine, 1 teaspoon; Mayonnaise, 1 teaspoon; Low-fat mayonnaise, 1 tablespoon; Salad dressing, 1 tablespoon; Low-fat salad dressing, 2 tablespoons; Oil, 1 teaspoon (use olive, canola, safflower, or other vegetable oils); Candy, hard, 3 pieces; Jelly or jam, 1 tablespoon); Jell-O, 1/2 cup; Jelly beans, 1/2 ounce; Maple syrup, 1 tablespoon; Popsicle, 1; Sherbet or nonfat or low-fat frozen yogurt, 1/2 cup; Sugar, 1 tablespoon; Sugared lemonade or fruit punch, 1 cup (8 ounces); Note: Try diet fruit-flavored gelatin or frozen, canned, or fresh fruit for dessert.\par \par Small amounts of alcohol may have benefits to the heart and blood pressure. However, excess use of alcohol can cause damage to the brain, liver and other organs. It can lead to high blood pressure. Drinking more than two drinks (15 ml) every day can raise your blood pressure. 15 ml of alcohol equals: \par • one 12-ounce bottle of beer \par • a half glass (5 ounces) of wine \par • 1 ounce (one shot) of 100 proof hard liquor\par \par \par 4  bmi  27     Weight loss, exercise, and diet control were discussed and are highly encouraged. Treatment options were given such as, aqua therapy, and contacting a nutritionist. Recommended to use the elliptical, stationary bike, less use of treadmill. Mindful eating was explained to the patient Obesity is associated with worsening asthma, shortness of breath, and potential for cardiac disease, diabetes, and other underlying medical conditions.\par \par \par -Nutrition and lifestyle concepts reviewed in detail. Recommending an unprocessed diet with >= 50% of nutrients from whole plant sources; most food early in the day; most calories before 4 pm, and no food after 8 pm; advised starting with small sustainable changes and building up over time. The long-term plan for this type of dietary change was reviewed. A number of resources to help in initiating these changes were provided.\par -A particular emphasis was placed on the need to remove processed foods from the diet. The concept of insulin resistance was introduced as important for understanding effective weight loss measures. Educational handouts explaining these concepts were provided.\par \par 5  food allergies  will test for  food allergies further .  \par 6 ibs  is a consideration    Irritable bowel syndrome (IBS) is a chronic functional disorder of the gastrointestinal tract characterized by chronic abdominal pain and altered bowel habits in the absence of an organic disease. Approximately 10 to 15 percent of adults and adolescents have symptoms consistent with IBS, and although not all individuals with IBS seek medical care, patients with IBS make up a significant percentage of all outpatient visits to gastroenterologists and other healthcare providers  Irritable bowel syndrome is defined as recurrent abdominal pain on average, at least one day per week in the last three months with two or more of the following: related to defecation, associated with a change in frequency of stool, or associated with a change in form (appearance) of stool\par A careful dietary history may reveal patterns of symptoms related to specific foods. Patients with IBS may benefit from exclusion of gas-producing foods; a diet low in fermentable oligo-, di-, and monosaccharides and polyols (FODMAPs); and in select cases, lactose and gluten avoidance . There is insufficient evidence to support routine food allergy testing in patients with IBS. \par \par Both a low FODMAP diet and a strict traditional IBS diet improve IBS symptoms. In a randomized trial, 75 patients with IBS were assigned to a low FODMAP diet or a more traditional IBS diet (regular meal pattern; avoidance of large meals; reduced intake of fat, insoluble fibers, caffeine, and gas-producing foods such as beans, cabbage, and onions) Note the traditional diet did include some food restriction of high FODMAP foods. At the end of the four-week trial, there was a significant reduction in IBS symptom severity in both dietary groups as compared with baseline. However, there was no significant difference in the reduction of symptom severity or the proportion of responders between the two groups. \par \par Exclusion of gas-producing foods — Patients with IBS should be advised to exclude foods that increase flatulence (eg, beans, onions, celery, carrots, raisins, bananas, apricots, prunes, [place] sprouts, wheat germ, pretzels, and bagels), alcohol, and caffeine [10]. Underlying visceral hypersensitivity may explain the exaggerated discomfort experienced by patients with IBS with the consumption of gas-producing foods\par \par Lactose avoidance — Patients with known lactose intolerance should be placed on a lactose-restricted diet. We also suggest an empiric trial of a lactose-free diet in patients who complain of persistent abdominal bloating despite exclusion of gas-producing foods. As improvement of symptoms does not necessarily imply lactose maldigestion, the diagnosis of lactose intolerance can be confirmed with breath testing in patients who do not want to be on a lactose-restricted diet in the long term without clear evidence of maldigestion. Individuals who have no evidence of lactose intolerance on breath test but who have symptoms with ingestion of milk may have intolerance to other milk components (eg, cow milk protein) and may tolerate milk from other mammals or ingestion of soy. (See "Lactose intolerance: Clinical manifestations, diagnosis, and management", section on 'Diagnosis' and "Food allergens: Overview of clinical features and cross-reactivity", section on 'Cow's milk'.)\par \par Although the incidence of lactose malabsorption is not higher in patients with IBS, patients with IBS and lactose intolerance have an exaggerated symptom response to lactose ingestion . Patients with undiagnosed lactose intolerance can have lasting clinical improvement when placed on a lactose-restricted diet. The clinical manifestations and diagnosis of lactose intolerance are discussed in detail, separately. \par Low FODMAP diet — We suggest a diet low in fermentable oligo-, di-, and monosaccharides and polyols (FODMAPs) in patients with IBS with abdominal bloating or pain despite exclusion of gas-producing foods. These short-chain carbohydrates are poorly absorbed and are osmotically active in the intestinal lumen where they are rapidly fermented, resulting in symptoms of abdominal bloating and pain. A low FODMAP diet involves elimination of a larger number of high FODMAP foods that would not be excluded in a diet that only required avoidance of gas-producing foods (eg, foods that contain fructose, including honey, high-fructose corn syrup, apples, pears, mangoes, cherries, or oligosaccharides, including wheat). Low FODMAP dietary education should be provided by a trained dietician to avoid unnecessary dietary over-restriction and a nutritionally replete diet  Low FODMAP education consists of initially eliminating FODMAPs from the diet for six to eight weeks and then, following symptom resolution, gradual reintroduction of foods high in fermentable carbohydrates to determine individual tolerance to specific fermentable carbohydrates \par \par Studies have demonstrated an improvement in IBS symptoms with FODMAP restriction . In a randomized, single-blind, crossover trial, 30 patients with IBS and 8 healthy controls were assigned to 21 days of a diet low in FODMAPs or a moderate FODMAP [place] diet followed by a 21-day washout period before crossing over to an alternate diet . Subjects with IBS, but not controls, had significantly lower overall gastrointestinal symptoms scores with an improvement in scores for abdominal pain, bloating, flatulence, and dissatisfaction with stool consistency while on a low FODMAP diet as compared with the moderate FODMAP diet and their diet at baseline. In another randomized trial 92 patients with IBS-D were assigned to a four week trial of a low FODMAP diet or modified [employer] [place] [employer] dietary recommendations (small frequent meals, avoid trigger foods, and avoid excess alcohol and caffeine). There was no significant difference in the proportion of patients reporting adequate relief of IBS-]. However, the low FODMAP group exhibited significantly higher rates of improvement in pain (51 versus 23 percent) and greater reductions in average daily scores for abdominal pain, bloating, stool consistency, frequency, and urgency.\par \par Gluten avoidance — Gluten has been demonstrated to alter bowel barrier functions in patients with IBS-D Nonceliac gluten sensitivity (NCGS) has been hypothesized as an underlying mechanism for symptom generation in patients with IBS but evidence to support gluten avoidance in patients with IBS has been conflicting\par \par It is feasible that symptomatic improvement associated with a gluten-free diet may not be caused by removal of the gluten protein, but rather the reduction of fructans. In a randomized double blind crossover trial, 59 patients with self-reported NCGS were assigned to diets containing gluten (without fructan), fructan (without gluten), or placebo for seven days followed by a minimum one week washout period [27]. Thirteen patients met Valentin III criteria for IBS. Overall gastrointestinal symptom rating scores and bloating, were significantly higher with fructan exposure as compared with gluten. There was no significant difference in symptom scores between gluten and placebo groups.\par  \par \par Fiber — The role of fiber in patients with IBS is controversial, but given the absence of serious side effects and potential benefit, psyllium/ispaghula should be considered in patients with IBS whose predominant symptom is constipation [20,28,29]. As some patients may experience increased bloating and gas, we suggest a starting dose of psyllium of one-half to one tablespoon daily. The dose should then be slowly titrated up based on response to treatment.\par

## 2023-06-29 NOTE — PHYSICAL EXAM
[Well Developed] : well developed [Well Nourished] : well nourished [Conjunctiva] : the conjunctiva were normal in both eyes [PERRL] : pupils were equal in size, round, and reactive to light [EOM Intact] : extraocular movements were intact [Cataract Right Eye] : a cataract was noted in the right eye [Cataract Left Eye] : a cataract was noted in the left eye [Outer Ear] : the ears and nose were normal in appearance [Oropharynx] : the oropharynx was normal [Normal Appearance] : was normal in appearance [Neck Supple] : was supple [Enlarged Diffusely] : was not enlarged [Rate ___] : at [unfilled] breaths per minute [Normal Rhythm/Effort] : normal respiratory rhythm and effort [Clear Bilaterally] : the lungs were clear to auscultation bilaterally [Normal to Percussion] : the lungs were normal to percussion [5th Left ICS - MCL] : palpated at the 5th LICS in the midclavicular line [Normal Rate] : normal [Heart Rate ___] : [unfilled] bpm [Rhythm Regular] : regular [Normal S1] : normal S1 [Normal S2] : normal S2 [S3] : no S3 [S4] : no S4 [No Murmur] : no murmurs heard [No Pitting Edema] : no pitting edema present [Rt] : varicose veins of the right leg noted [Lt] : varicose veins of the left leg noted [Right Carotid Bruit] : no bruit heard over the right carotid [Left Carotid Bruit] : no bruit heard over the left carotid [Right Femoral Bruit] : no bruit heard over the right femoral artery [Left Femoral Bruit] : no bruit heard over the left femoral artery [2+] : left 2+ [No Abnormalities] : the abdominal aorta was not enlarged and no bruit was heard [Bruit] : no bruit heard [Soft, Nontender] : the abdomen was soft and nontender [No Mass] : no masses were palpated [No HSM] : no hepatosplenomegaly noted [None] : no CVA tenderness [Normal rectal exam] : was normal [Internal Hemorrhoid] : no internal hemorrhoids were present [External Hemorrhoid] : no external hemorrhoids were present [Tender, Swollen] : that was nontender and non-swollen [Occult Blood Positive] : was negative for occult blood [Gross Blood] : no gross blood [Stool Sample Taken] : a stool sample was obtained [Fecal Impaction] : no fecal impaction was present [Postauricular Lymph Nodes Enlarged Bilaterally] : nodes not enlarged [Preauricular Lymph Nodes Enlarged Bilaterally] : nodes not enlarged [Submandibular Lymph Nodes Enlarged Bilaterally] : nodes not enlarged [Suboccipital Lymph Nodes Enlarged Bilaterally] : nodes not enlarged [Submental Lymph Nodes Enlarged] : nodes not enlarged [Cervical Lymph Nodes Enlarged Posterior Bilaterally] : nodes not enlarged [Cervical Lymph Nodes Enlarged Anterior Bilaterally] : nodes not enlarged [Supraclavicular Lymph Nodes Enlarged Bilaterally] : nodes not enlarged [Axillary Lymph Nodes Enlarged Bilaterally] : nodes not enlarged [Epitrochlear Lymph Nodes Enlarged Bilaterally] : nodes not enlarged [Femoral Lymph Nodes Enlarged Bilaterally] : nodes not enlarged [Inguinal Lymph Nodes Enlarged Bilaterally] : nodes not enlarged [No Lymphangitis] : no lymphangitis observed [Normal Kyphosis] : normal kyphosis [No Visual Abnormalities] : no visible abnormalities [Normal Lordosis] : normal lordosis [No Scoliosis] : no scoliosis [No Tenderness to Palpation] : no spine tenderness on palpation [No Masses] : no masses [Full ROM] : full ROM [No Pain with ROM] : no pain with motion in any direction [Intact] : all reflexes within normal limits bilaterally [Normal Station and Gait] : the gait and station were normal [Normal Motor Tone] : the muscle tone was normal [Involuntary Movements] : no involuntary movements were seen [Normal Scalp] : inspection of the scalp showed no abnormalities [Examination Of The Hair] : texture and distribution of hair was normal [Abnormal Color] : normal color and pigmentation [Complexion Medium] : medium complexion [Skin Lesions 1] : no skin lesions were observed [Tattoo - Single] : no tattoos observed [Skin Turgor Decreased] : normal skin turgor [Normal] : the deep tendon reflexes were normal [Normal Mental Status] : the patient's orientation, memory, attention, language and fund of knowledge were normal [Appropriate] : appropriate [Impaired judgment] : intact judgment [Impaired Insight] : intact insight

## 2023-06-30 ENCOUNTER — APPOINTMENT (OUTPATIENT)
Dept: ORTHOPEDIC SURGERY | Facility: CLINIC | Age: 73
End: 2023-06-30
Payer: MEDICARE

## 2023-06-30 VITALS — WEIGHT: 139 LBS | BODY MASS INDEX: 26.24 KG/M2 | HEIGHT: 61 IN

## 2023-06-30 DIAGNOSIS — K21.9 GASTRO-ESOPHAGEAL REFLUX DISEASE W/OUT ESOPHAGITIS: ICD-10-CM

## 2023-06-30 DIAGNOSIS — K30 FUNCTIONAL DYSPEPSIA: ICD-10-CM

## 2023-06-30 DIAGNOSIS — Z76.89 PERSONS ENCOUNTERING HEALTH SERVICES IN OTHER SPECIFIED CIRCUMSTANCES: ICD-10-CM

## 2023-06-30 DIAGNOSIS — R19.5 OTHER FECAL ABNORMALITIES: ICD-10-CM

## 2023-06-30 DIAGNOSIS — M75.42 IMPINGEMENT SYNDROME OF LEFT SHOULDER: ICD-10-CM

## 2023-06-30 PROCEDURE — 73030 X-RAY EXAM OF SHOULDER: CPT | Mod: LT

## 2023-06-30 PROCEDURE — 99213 OFFICE O/P EST LOW 20 MIN: CPT

## 2023-06-30 RX ORDER — MELOXICAM 7.5 MG/1
7.5 TABLET ORAL
Qty: 30 | Refills: 0 | Status: ACTIVE | COMMUNITY
Start: 2023-06-30 | End: 1900-01-01

## 2023-06-30 NOTE — PHYSICAL EXAM
[de-identified] : Constitutional: Well-nourished, well-developed, No acute distress\par Respiratory:  Good respiratory effort, no SOB\par Psychiatric: Pleasant and normal affect, alert and oriented x3\par Skin: Clean dry and intact B/L UE\par Musculoskeletal: normal except where as noted in regional exam\par \par \par Right Shoulder:\par APPEARANCE: no marked deformities, no swelling or malalignment\par POSITIVE TENDERNESS: none\par NONTENDER: supraspinatus, infraspinatus, teres minor, LH biceps, anterior and posterior capsule, AC joint\par ROM: full & painless, no scapular winging or dyskinesia present\par RESISTIVE TESTING: painless 5/5 resisted flex/ext, empty can/ER/IR, horizontal abd/add \par SPECIAL TESTS: neg Drop Arm, neg Empty Can, neg Whitt/Neers, neg Smith's, neg Speeds, neg Apprehension, neg cross arm adduction, neg apley's scratch test\par \par Left Shoulder:\par APPEARANCE: no marked deformities, no swelling or malalignment\par POSITIVE TENDERNESS: supraspinatus, long head biceps tendon\par NONTENDER:  infraspinatus, teres minor. biceps. anterior and posterior capsule. AC joint. \par ROM: full with mild painful arc past 60 degrees, no scapular winging or dyskinesia present\par RESISTIVE TESTING: MMT 4+/5 ER, Flexion and Empty can, 5/5 IR. painless 5/5 resisted ext, horizontal abd/add \par SPECIAL TESTS: + Whitt and Neers, mildly + cross arm adduction, + Speeds, neg Smith's, neg Drop Arm, neg Apprehension. neg apley's scratch test\par \par  [de-identified] : \par The following radiographs were ordered and read by me during this patient's visit. I reviewed each radiograph in detail with the patient and discussed the findings as highlighted below. \par \par 3 views of the left shoulder were obtained today that show no fracture, or dislocation. There are no degenerative changes seen. There is no malalignment. No obvious osseous abnormality. Otherwise unremarkable.

## 2023-06-30 NOTE — HISTORY OF PRESENT ILLNESS
[de-identified] : LHD Patient is here for left shoulder pain that began about 3 weeks ago. There was no inciting injury. This is the first evaluation for this issue.  She injured her arm about 30 years ago. She has tried Ibuprofen. Denies N/T/R. 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 \par \par The patient's past medical history, past surgical history, medications and allergies were reviewed by me today and documented accordingly. In addition, the patient's family and social history, which were noncontributory to this visit, were reviewed also. Intake form was reviewed. The patient has no family history of arthritis.

## 2023-06-30 NOTE — DISCUSSION/SUMMARY
[de-identified] : Discussed findings of today's exam and possible causes of patient's pain.  Educated patient on their most probable diagnosis of left shoulder impingement.  Reviewed possible courses of treatment, and we collaboratively decided best course of treatment at this time will include conservative management.  Patient started on a course of oral NSAIDs, prescription given for Mobic (We discussed all possible side effects of this medication).  Patient will be started on a course of physical therapy to restore normal range of motion and strength as tolerated.  Patient has no reported injury or trauma, no evidence of any high-grade myofascial tear or rupture, no need for MRI at this time.  If patient has persisting pain may consider cortisone injection at that time.  Follow up as needed.  Patient appreciates and agrees with current plan.\par  \par \par This note was generated using dragon medical dictation software.  A reasonable effort has been made for proofreading its contents, but typos may still remain.  If there are any questions or points of clarification needed please notify my office.\par

## 2023-07-04 LAB
25(OH)D3 SERPL-MCNC: 36.6 NG/ML
ALBUMIN SERPL ELPH-MCNC: 4.6 G/DL
ALP BLD-CCNC: 61 U/L
ALT SERPL-CCNC: 17 U/L
ANION GAP SERPL CALC-SCNC: 13 MMOL/L
AST SERPL-CCNC: 21 U/L
BARLEY IGE QN: <0.1 KUA/L
BILIRUB SERPL-MCNC: 0.4 MG/DL
BUN SERPL-MCNC: 19 MG/DL
CALCIUM SERPL-MCNC: 9.4 MG/DL
CHERRY IGE QN: <0.1 KUA/L
CHLORIDE SERPL-SCNC: 104 MMOL/L
CHOLEST SERPL-MCNC: 234 MG/DL
CO2 SERPL-SCNC: 25 MMOL/L
COW MILK IGE QN: <0.1 KUA/L
CRAB IGE QN: <0.1 KUA/L
CREAT SERPL-MCNC: 0.56 MG/DL
DEPRECATED BARLEY IGE RAST QL: 0
DEPRECATED CHERRY IGE RAST QL: 0
DEPRECATED COW MILK IGE RAST QL: 0
DEPRECATED CRAB IGE RAST QL: 0
DEPRECATED EGG WHITE IGE RAST QL: NORMAL
DEPRECATED OAT IGE RAST QL: 0
DEPRECATED PEANUT IGE RAST QL: 0
DEPRECATED RYE IGE RAST QL: 0
DEPRECATED SOYBEAN IGE RAST QL: 0
DEPRECATED WHEAT IGE RAST QL: 0
EGFR: 97 ML/MIN/1.73M2
EGG WHITE IGE QN: 0.2 KUA/L
ESTIMATED AVERAGE GLUCOSE: 108 MG/DL
FRUCTOSAMINE SERPL-MCNC: 238 UMOL/L
GLUCOSE SERPL-MCNC: 83 MG/DL
HBA1C MFR BLD HPLC: 5.4 %
HBV CORE IGG+IGM SER QL: NONREACTIVE
HBV SURFACE AB SER QL: REACTIVE
HBV SURFACE AG SER QL: NONREACTIVE
HCV AB SER QL: NONREACTIVE
HCV S/CO RATIO: 0.05 S/CO
HDLC SERPL-MCNC: 68 MG/DL
HEPATITIS A IGG ANTIBODY: NONREACTIVE
IGA SER QL IEP: 203 MG/DL
LDLC SERPL CALC-MCNC: 152 MG/DL
NONHDLC SERPL-MCNC: 166 MG/DL
OAT IGE QN: <0.1 KUA/L
PEANUT IGE QN: <0.1 KUA/L
POTASSIUM SERPL-SCNC: 4.4 MMOL/L
PROT SERPL-MCNC: 7.4 G/DL
RYE IGE QN: <0.1 KUA/L
SODIUM SERPL-SCNC: 142 MMOL/L
SOYBEAN IGE QN: <0.1 KUA/L
TOTAL IGE SMQN RAST: 64 KU/L
TRIGL SERPL-MCNC: 71 MG/DL
TSH SERPL-ACNC: 1.55 UIU/ML
TTG IGA SER IA-ACNC: <1.2 U/ML
TTG IGA SER-ACNC: NEGATIVE
TTG IGG SER IA-ACNC: <1.2 U/ML
TTG IGG SER IA-ACNC: NEGATIVE
UREA BREATH TEST QL: NEGATIVE
WHEAT IGE QN: <0.1 KUA/L

## 2023-07-07 ENCOUNTER — NON-APPOINTMENT (OUTPATIENT)
Age: 73
End: 2023-07-07

## 2023-07-07 ENCOUNTER — APPOINTMENT (OUTPATIENT)
Dept: INTERNAL MEDICINE | Facility: CLINIC | Age: 73
End: 2023-07-07
Payer: MEDICARE

## 2023-07-07 VITALS
TEMPERATURE: 97.9 F | SYSTOLIC BLOOD PRESSURE: 140 MMHG | DIASTOLIC BLOOD PRESSURE: 64 MMHG | HEIGHT: 61 IN | HEART RATE: 80 BPM | WEIGHT: 140 LBS | BODY MASS INDEX: 26.43 KG/M2 | OXYGEN SATURATION: 97 %

## 2023-07-07 DIAGNOSIS — E78.00 PURE HYPERCHOLESTEROLEMIA, UNSPECIFIED: ICD-10-CM

## 2023-07-07 DIAGNOSIS — M81.0 AGE-RELATED OSTEOPOROSIS W/OUT CURRENT PATHOLOGICAL FRACTURE: ICD-10-CM

## 2023-07-07 DIAGNOSIS — Z00.00 ENCOUNTER FOR GENERAL ADULT MEDICAL EXAMINATION W/OUT ABNORMAL FINDINGS: ICD-10-CM

## 2023-07-07 PROCEDURE — 99213 OFFICE O/P EST LOW 20 MIN: CPT | Mod: 25

## 2023-07-07 PROCEDURE — G0439: CPT

## 2023-07-07 PROCEDURE — 93000 ELECTROCARDIOGRAM COMPLETE: CPT

## 2023-07-07 NOTE — HEALTH RISK ASSESSMENT
[Yes] : Yes [No] : In the past 12 months have you used drugs other than those required for medical reasons? No [No falls in past year] : Patient reported no falls in the past year [0] : 2) Feeling down, depressed, or hopeless: Not at all (0) [Patient reported mammogram was normal] : Patient reported mammogram was normal [Patient declined PAP Smear] : Patient declined PAP Smear [Patient reported bone density results were abnormal] : Patient reported bone density results were abnormal [Patient reported colonoscopy was normal] : Patient reported colonoscopy was normal [Employed] : employed [Retired] : retired [] :  [# Of Children ___] : has [unfilled] children [Fully functional (bathing, dressing, toileting, transferring, walking, feeding)] : Fully functional (bathing, dressing, toileting, transferring, walking, feeding) [Fully functional (using the telephone, shopping, preparing meals, housekeeping, doing laundry, using] : Fully functional and needs no help or supervision to perform IADLs (using the telephone, shopping, preparing meals, housekeeping, doing laundry, using transportation, managing medications and managing finances) [Smoke Detector] : smoke detector [Carbon Monoxide Detector] : carbon monoxide detector [Seat Belt] :  uses seat belt [Sunscreen] : uses sunscreen [de-identified] : occ [de-identified] : walking [de-identified] : healthy [[id number]] : 0 [Change in mental status noted] : No change in mental status noted [Reports changes in hearing] : Reports no changes in hearing [Reports changes in vision] : Reports no changes in vision [Reports changes in dental health] : Reports no changes in dental health [TB Exposure] : is not being exposed to tuberculosis [MammogramDate] : 2022 [BoneDensityDate] : 2022 [BoneDensityComments] : osteoporosis [ColonoscopyDate] : 2016 [FreeTextEntry2] : part time

## 2023-07-07 NOTE — HISTORY OF PRESENT ILLNESS
[FreeTextEntry1] : CPE [de-identified] : Pt reports feeling well today.\par Under GI specialist care.

## 2023-08-16 ENCOUNTER — APPOINTMENT (OUTPATIENT)
Dept: INTERNAL MEDICINE | Facility: CLINIC | Age: 73
End: 2023-08-16

## 2023-08-30 ENCOUNTER — APPOINTMENT (OUTPATIENT)
Dept: INTERNAL MEDICINE | Facility: CLINIC | Age: 73
End: 2023-08-30
Payer: MEDICARE

## 2023-08-30 VITALS
WEIGHT: 142 LBS | OXYGEN SATURATION: 98 % | DIASTOLIC BLOOD PRESSURE: 76 MMHG | HEART RATE: 81 BPM | BODY MASS INDEX: 26.81 KG/M2 | HEIGHT: 61 IN | SYSTOLIC BLOOD PRESSURE: 130 MMHG

## 2023-08-30 DIAGNOSIS — B35.1 TINEA UNGUIUM: ICD-10-CM

## 2023-08-30 PROCEDURE — 99213 OFFICE O/P EST LOW 20 MIN: CPT

## 2023-08-30 NOTE — PHYSICAL EXAM
[No Acute Distress] : no acute distress [Well Nourished] : well nourished [Alert and Oriented x3] : oriented to person, place, and time [Normal Mood] : the mood was normal [de-identified] : left second toe fungus

## 2023-08-30 NOTE — HISTORY OF PRESENT ILLNESS
[FreeTextEntry8] : Pt reports  left second toe slight swelling and tenderness since few weeks. Seen in UC.  No numbness or tingling sensation.  Denies fever,chills.

## 2023-09-16 NOTE — ED ADULT TRIAGE NOTE - NSWEIGHTCALCTOOLDRUG_GEN_A_CORE
PAST SURGICAL HISTORY:  H/O: hysterectomy     History of cholecystectomy     S/P tonsillectomy       used

## 2023-09-18 ENCOUNTER — APPOINTMENT (OUTPATIENT)
Dept: INTERNAL MEDICINE | Facility: CLINIC | Age: 73
End: 2023-09-18

## 2023-09-22 ENCOUNTER — APPOINTMENT (OUTPATIENT)
Dept: INTERNAL MEDICINE | Facility: HOSPITAL | Age: 73
End: 2023-09-22

## 2023-10-07 ENCOUNTER — APPOINTMENT (OUTPATIENT)
Dept: MAMMOGRAPHY | Facility: CLINIC | Age: 73
End: 2023-10-07

## 2023-10-20 DIAGNOSIS — N60.19 DIFFUSE CYSTIC MASTOPATHY OF UNSPECIFIED BREAST: ICD-10-CM

## 2023-12-26 ENCOUNTER — APPOINTMENT (OUTPATIENT)
Dept: ORTHOPEDIC SURGERY | Facility: CLINIC | Age: 73
End: 2023-12-26
Payer: MEDICARE

## 2023-12-26 PROCEDURE — 99213 OFFICE O/P EST LOW 20 MIN: CPT

## 2023-12-26 NOTE — PHYSICAL EXAM
[de-identified] : Patient is WDWN, alert, and in no acute distress. Breathing is unlabored. She is grossly oriented to person, place, and time.  RIGHT Humerus: Limited ROM with pain No discoloration Mild edema    [de-identified] : AP, lateral and oblique views of the LEFT Humerus were obtained today and revealed a displaced comminuted humerus neck fracture.

## 2023-12-26 NOTE — ADDENDUM
[FreeTextEntry1] : I, Christiano Maxwell wrote this note acting as a scribe for Dr. Sami Aburto on Dec 26, 2023

## 2023-12-26 NOTE — END OF VISIT
[FreeTextEntry3] : All medical record entries made by the Scribe were at my, Dr. Sami Aburto MD., direction and personally dictated by me on 12/26/2023. I have personally reviewed the chart and agree that the record accurately reflects my personal performance of the history, physical exam, assessment and plan.

## 2023-12-26 NOTE — DISCUSSION/SUMMARY
[de-identified] : The underlying pathophysiology was reviewed with the patient. XR films were reviewed with the patient. Discussed at length the nature of the patient's condition.    At this time, we discussed the patient's x-rays, which showed_. I advised the patient that the best treatment option would be to have surgery. I also informed the patient that f she pleases, she can wait and see if there is any improvement before deciding to have the surgery. The patient has decided to wait. She may use Lidocaine patches, Tylenol, or Aleve for pain relief. She should also avoid any major movement to avoid re-injuring the area.   All questions answered, understanding verbalized. Patient in agreement with plan of care. Patient may follow up as needed.

## 2023-12-26 NOTE — HISTORY OF PRESENT ILLNESS
[de-identified] : Pt is a 73-year-old female with pain in her LEFT Humerus. She had fallen at home. She states that she is sleep deprived because when she lays down, it creates pressure on the site of he injury. She describes the pain to radiate from her elbow down to her wrist.

## 2024-01-04 ENCOUNTER — APPOINTMENT (OUTPATIENT)
Dept: ORTHOPEDIC SURGERY | Facility: CLINIC | Age: 74
End: 2024-01-04
Payer: MEDICARE

## 2024-01-04 VITALS — HEIGHT: 61 IN | BODY MASS INDEX: 26.81 KG/M2 | WEIGHT: 142 LBS

## 2024-01-04 PROCEDURE — 99213 OFFICE O/P EST LOW 20 MIN: CPT

## 2024-01-04 PROCEDURE — 73060 X-RAY EXAM OF HUMERUS: CPT

## 2024-01-05 NOTE — HISTORY OF PRESENT ILLNESS
[de-identified] : Pt is a 73-year-old female with pain in her LEFT Humerus. She had fallen at home. She states that she is sleep deprived because when she lays down, it creates pressure on the site of he injury. She describes the pain to radiate from her elbow down to her wrist.  Today, Jan 04, 2024 patient is here for a follow up and further evaluation.

## 2024-01-05 NOTE — DISCUSSION/SUMMARY
[de-identified] : The underlying pathophysiology was reviewed with the patient. XR films were reviewed with the patient. Discussed at length the nature of the patient's condition.    At this time, we discussed the patient's x-rays, which showed. Like last time, I advised the patient that the best treatment option would be to have surgery. I also informed the patient that if she pleases, she can wait and see if there is any improvement before deciding to have the surgery. The patient has decided to wait. She is using Lidocaine patches, Tylenol, or Aleve for pain relief. She should also avoid any major movement to avoid re-injuring the area.   All questions answered, understanding verbalized. Patient in agreement with plan of care.    Patient was advised to follow up in 2 weeks.

## 2024-01-05 NOTE — PHYSICAL EXAM
[de-identified] : Patient is WDWN, alert, and in no acute distress. Breathing is unlabored. She is grossly oriented to person, place, and time.  Left Humerus:  Present in a sling Limited ROM with pain No discoloration Mild edema Sensation intact in all digits    [de-identified] : AP, lateral and oblique views of the LEFT Humerus were obtained today and revealed a displaced comminuted humerus neck fracture, healing well.

## 2024-01-05 NOTE — ADDENDUM
[FreeTextEntry1] :  I, Izabela Forbes wrote this note acting as a scribe for Dr. Sami Aburto on Jan 04, 2024.

## 2024-01-05 NOTE — END OF VISIT
[FreeTextEntry3] :  All medical record entries made by the Scribe were at my,  Dr. Sami Aburto MD., direction and personally dictated by me on 01/04/2024. I have personally reviewed the chart and agree that the record accurately reflects my personal performance of the history, physical exam, assessment and plan.

## 2024-01-06 ENCOUNTER — APPOINTMENT (OUTPATIENT)
Dept: INTERNAL MEDICINE | Facility: CLINIC | Age: 74
End: 2024-01-06

## 2024-01-18 ENCOUNTER — APPOINTMENT (OUTPATIENT)
Dept: ORTHOPEDIC SURGERY | Facility: CLINIC | Age: 74
End: 2024-01-18
Payer: MEDICARE

## 2024-01-18 VITALS — BODY MASS INDEX: 26.81 KG/M2 | WEIGHT: 142 LBS | HEIGHT: 61 IN

## 2024-01-18 PROCEDURE — 73030 X-RAY EXAM OF SHOULDER: CPT | Mod: LT

## 2024-01-18 PROCEDURE — 99213 OFFICE O/P EST LOW 20 MIN: CPT

## 2024-01-22 ENCOUNTER — NON-APPOINTMENT (OUTPATIENT)
Age: 74
End: 2024-01-22

## 2024-02-01 ENCOUNTER — APPOINTMENT (OUTPATIENT)
Dept: ORTHOPEDIC SURGERY | Facility: CLINIC | Age: 74
End: 2024-02-01
Payer: MEDICARE

## 2024-02-01 VITALS — WEIGHT: 142 LBS | HEIGHT: 61 IN | BODY MASS INDEX: 26.81 KG/M2

## 2024-02-01 PROCEDURE — 73060 X-RAY EXAM OF HUMERUS: CPT

## 2024-02-01 PROCEDURE — 99213 OFFICE O/P EST LOW 20 MIN: CPT

## 2024-02-01 NOTE — END OF VISIT
[FreeTextEntry3] :  All medical record entries made by the Scribe were at my,  Dr. Sami Aburto MD., direction and personally dictated by me on 02/01/2024. I have personally reviewed the chart and agree that the record accurately reflects my personal performance of the history, physical exam, assessment and plan.

## 2024-02-01 NOTE — DISCUSSION/SUMMARY
[de-identified] : The underlying pathophysiology was reviewed with the patient. XR films were reviewed with the patient. Discussed at length the nature of the patient's condition.    We discussed the patient's x-rays, which showed that the fracture is healing well. She was advised to use Lidocaine patches, Tylenol, or Aleve for pain relief. Gentle ROM and strengthening were demonstrated for her to do at home to improve her ROM.   The patient was encouraged to take Calcium Citrate, Vitamin D3 and Vitamin C along with a high calcium diet is advised to promote bone health and healing.  All questions answered, understanding verbalized. Patient in agreement with plan of care.   Patient was advised to follow up in 3 months.

## 2024-02-01 NOTE — PHYSICAL EXAM
[de-identified] : Patient is WDWN, alert, and in no acute distress. Breathing is unlabored. She is grossly oriented to person, place, and time.  Left Humerus:  Limited ROM with pain No discoloration Mild edema Sensation intact in all digits [de-identified] : AP, lateral and oblique views of the LEFT Humerus were obtained today and revealed a displaced comminuted humerus neck fracture, which is well aligned and healing well.

## 2024-02-01 NOTE — ADDENDUM
[FreeTextEntry1] :  I, Izabela Forbes wrote this note acting as a scribe for Dr. Sami Aburto on Feb 01, 2024.

## 2024-02-01 NOTE — HISTORY OF PRESENT ILLNESS
[de-identified] : Pt is a 73-year-old female with pain in her LEFT Humerus. She had fallen at home. She states that she is sleep deprived because when she lays down, it creates pressure on the site of he injury. She describes the pain to radiate from her elbow down to her wrist.    Today, Feb 01, 2024 patient is here for a follow up and further evaluation. She is concerned with the pain during movement.

## 2024-02-21 ENCOUNTER — APPOINTMENT (OUTPATIENT)
Dept: INTERNAL MEDICINE | Facility: CLINIC | Age: 74
End: 2024-02-21
Payer: MEDICARE

## 2024-02-21 VITALS
HEART RATE: 75 BPM | OXYGEN SATURATION: 98 % | DIASTOLIC BLOOD PRESSURE: 84 MMHG | SYSTOLIC BLOOD PRESSURE: 158 MMHG | WEIGHT: 142 LBS | BODY MASS INDEX: 26.81 KG/M2 | HEIGHT: 61 IN

## 2024-02-21 DIAGNOSIS — S42.309A UNSPECIFIED FRACTURE OF SHAFT OF HUMERUS, UNSPECIFIED ARM, INITIAL ENCOUNTER FOR CLOSED FRACTURE: ICD-10-CM

## 2024-02-21 DIAGNOSIS — D18.00 HEMANGIOMA UNSPECIFIED SITE: ICD-10-CM

## 2024-02-21 DIAGNOSIS — D18.01 HEMANGIOMA OF SKIN AND SUBCUTANEOUS TISSUE: ICD-10-CM

## 2024-02-21 DIAGNOSIS — R29.6 REPEATED FALLS: ICD-10-CM

## 2024-02-21 PROCEDURE — 99213 OFFICE O/P EST LOW 20 MIN: CPT

## 2024-02-22 PROBLEM — D18.00 HEMANGIOMA: Status: ACTIVE | Noted: 2024-02-22

## 2024-02-22 PROBLEM — R29.6 FALLS FREQUENTLY: Status: ACTIVE | Noted: 2024-02-22

## 2024-02-22 PROBLEM — D18.01 HEMANGIOMA OF SKIN: Status: ACTIVE | Noted: 2024-02-22

## 2024-02-22 PROBLEM — S42.309A HUMERUS FRACTURE: Status: ACTIVE | Noted: 2023-12-26

## 2024-02-22 NOTE — HISTORY OF PRESENT ILLNESS
[FreeTextEntry8] : PT REPORTS NOTICING LEFT SKIN BREAST LUMP FEW DAYS AGO.BUT DEASPERING YESTERDAY. MAMMOGRAM WAS NL LAST NOV. ALSO C/O RED MULTIPLE SPOTS ON HER SKIN. DENIES  PAIN.  PT IS S/P HUMERUS FRACTURE IN DEC. AFTER FALL.

## 2024-02-22 NOTE — PHYSICAL EXAM
[Well Developed] : well developed [No Acute Distress] : no acute distress [Well Nourished] : well nourished [Normal Appearance] : normal in appearance [No Masses] : no palpable masses [No Nipple Discharge] : no nipple discharge [No Axillary Lymphadenopathy] : no axillary lymphadenopathy [Alert and Oriented x3] : oriented to person, place, and time [Normal Mood] : the mood was normal [de-identified] : MULTIPLE HEMIANGOMAS

## 2024-03-14 ENCOUNTER — EMERGENCY (EMERGENCY)
Facility: HOSPITAL | Age: 74
LOS: 1 days | Discharge: HOME | End: 2024-03-14
Attending: EMERGENCY MEDICINE
Payer: COMMERCIAL

## 2024-03-14 VITALS
DIASTOLIC BLOOD PRESSURE: 93 MMHG | TEMPERATURE: 99 F | HEIGHT: 61 IN | WEIGHT: 141.98 LBS | HEART RATE: 85 BPM | RESPIRATION RATE: 18 BRPM | SYSTOLIC BLOOD PRESSURE: 149 MMHG

## 2024-03-14 DIAGNOSIS — Z98.51 TUBAL LIGATION STATUS: Chronic | ICD-10-CM

## 2024-03-14 DIAGNOSIS — Z98.890 OTHER SPECIFIED POSTPROCEDURAL STATES: Chronic | ICD-10-CM

## 2024-03-14 PROCEDURE — 70450 CT HEAD/BRAIN W/O DYE: CPT | Mod: MC

## 2024-03-14 PROCEDURE — 73060 X-RAY EXAM OF HUMERUS: CPT | Mod: 26,LT

## 2024-03-14 PROCEDURE — 73060 X-RAY EXAM OF HUMERUS: CPT

## 2024-03-14 PROCEDURE — 99284 EMERGENCY DEPT VISIT MOD MDM: CPT | Mod: 25

## 2024-03-14 PROCEDURE — 99285 EMERGENCY DEPT VISIT HI MDM: CPT

## 2024-03-14 NOTE — ED PROVIDER NOTE - PROGRESS NOTE DETAILS
Case discussed with ortho PA on call, AUREA Apodaca, and he says that Pt may be sent home with arm sling and f/u with her own orthopedic doctor.  Pt informed and sling applied.  Pt says she actually prefers to go home and see her orthopedist.

## 2024-03-14 NOTE — ED PROVIDER NOTE - PATIENT PORTAL LINK FT
You can access the FollowMyHealth Patient Portal offered by [facility] by registering at the following website: [email or web address]. By joining [facility]’s FollowMyHealth portal, you will also be able to view your health information using other applications (apps) compatible with our system.

## 2024-03-14 NOTE — ED ADULT NURSE NOTE - NSFALLRISKINTERV_ED_ALL_ED

## 2024-03-14 NOTE — ED PROVIDER NOTE - CLINICAL SUMMARY MEDICAL DECISION MAKING FREE TEXT BOX
74-year-old woman, history of comminuted fracture to her left humerus 13 weeks ago and has been in physical therapy and recovery however today just prior to arrival she was on the floor working on something when she tried to stand up and then accidentally fell over, striking her in the left upper arm in the similar area where she had the prior fracture, and striking her right lower lateral forehead area--  Will check CT of the head and x-ray of the left humerus; patient declines pain medication at this time.

## 2024-03-14 NOTE — ED PROVIDER NOTE - NSFOLLOWUPINSTRUCTIONS_ED_ALL_ED_FT
Follow-up with Dr. Aburto, your orthopedic doctor, as soon as possible.          ARM FRACTURE IN ADULTS - Discharge Care    Arm Fracture in Adults    WHAT YOU NEED TO KNOW:    An arm fracture is a crack or break in one or more of the bones in your arm.  Adult Arm Bones    DISCHARGE INSTRUCTIONS:    Seek care immediately if:    The pain in your injured arm does not get better or gets worse, even after you rest and take medicine.    Your injured arm, hand, or fingers feel numb.    Your arm is swollen, red, and feels warm.    Your skin over the fracture is swollen, cold, or pale.    You cannot move your arm, hand, or fingers.  Call your doctor if:    You have a fever.    Your brace or splint becomes wet, damaged, or comes off.    You have questions or concerns about your injury, treatment, or care.  Medicines: You may need any of the following:    NSAIDs, such as ibuprofen, help decrease swelling, pain, and fever. This medicine is available with or without a doctor's order. NSAIDs can cause stomach bleeding or kidney problems in certain people. If you take blood thinner medicine, always ask your healthcare provider if NSAIDs are safe for you. Always read the medicine label and follow directions.    Acetaminophen decreases pain and fever. It is available without a doctor's order. Ask how much to take and how often to take it. Follow directions. Read the labels of all other medicines you are using to see if they also contain acetaminophen, or ask your doctor or pharmacist. Acetaminophen can cause liver damage if not taken correctly.    Prescription pain medicine may be given. Ask your healthcare provider how to take this medicine safely. Some prescription pain medicines contain acetaminophen. Do not take other medicines that contain acetaminophen without talking to your healthcare provider. Too much acetaminophen may cause liver damage. Prescription pain medicine may cause constipation. Ask your healthcare provider how to prevent or treat constipation.    Take your medicine as directed. Contact your healthcare provider if you think your medicine is not helping or if you have side effects. Tell your provider if you are allergic to any medicine. Keep a list of the medicines, vitamins, and herbs you take. Include the amounts, and when and why you take them. Bring the list or the pill bottles to follow-up visits. Carry your medicine list with you in case of an emergency.  Self-care:    Elevate your arm above the level of your heart as often as you can. This will help decrease swelling and pain. Prop your arm on pillows or blankets to keep it elevated comfortably.        Apply ice on your arm for 15 to 20 minutes every hour or as directed. Use an ice pack, or put crushed ice in a plastic bag. Cover it with a towel. Ice helps prevent tissue damage and decreases swelling and pain.    Rest your arm as much as possible. Ask your healthcare provider when you can put pressure or weight on your arm. Also ask when you can return to sports or exercise.  Care for your cast or splint: Ask your healthcare provider when it is okay to bathe. Do not get your cast or splint wet. Before you take a bath or shower, cover your cast or splint with a plastic bag. Tape the bag to your skin to help keep water out. Hold your arm away from the water in case the bag has a hole or tear.    Check the skin around your cast or splint each day for any redness or open skin.    Do not use a sharp or pointed object to scratch your skin under the cast or splint.  Physical therapy: A physical therapist teaches you exercises to help improve movement and strength, and to decrease pain.    Follow up with your doctor within 1 week: You may need to see a bone specialist within 3 to 4 days if you need surgery or more treatment. Write down your questions so you remember to ask them during your visits.    © [employer] 1973, 2024    	  back to top            © [employer] 1973, 2024              Log Out.  [employer] CareNotes®  :  [facility]        HEAD INJURY - Discharge Care    Head Injury    WHAT YOU NEED TO KNOW:    A head injury can include your scalp, face, skull, or brain and range from mild to severe. Effects can appear immediately after the injury or develop later. The effects may last a short time or be permanent. Healthcare providers may want to check your recovery over time. Treatment may change as you recover or develop new health problems from the head injury.    DISCHARGE INSTRUCTIONS:    Call your local emergency number (911 in the [place]), or have someone else call if:    You cannot be woken.    You have a seizure.    You stop responding to others or you faint.    You have blurry or double vision.    Your speech becomes slurred or confused.    You have arm or leg weakness, loss of feeling, or new problems with coordination.    Your pupils are larger than usual, or one pupil is a different size than the other.    You have blood or clear fluid coming out of your ears or nose.  Seek care immediately if:    You have repeated or forceful vomiting.    You feel confused.    Your headache gets worse or becomes severe.    You or someone caring for you notices that you are harder to wake than usual.  Call your doctor if:    Your symptoms last longer than 6 weeks after the injury.    You have questions or concerns about your condition or care.  Medicines:    Acetaminophen decreases pain and fever. It is available without a doctor's order. Ask how much to take and how often to take it. Follow directions. Read the labels of all other medicines you are using to see if they also contain acetaminophen, or ask your doctor or pharmacist. Acetaminophen can cause liver damage if not taken correctly.    Take your medicine as directed. Contact your healthcare provider if you think your medicine is not helping or if you have side effects. Tell your provider if you are allergic to any medicine. Keep a list of the medicines, vitamins, and herbs you take. Include the amounts, and when and why you take them. Bring the list or the pill bottles to follow-up visits. Carry your medicine list with you in case of an emergency.  Self-care:    Rest or do quiet activities. Limit your time watching TV, using the computer, or doing tasks that require a lot of thinking. Slowly return to your normal activities as directed. Do not play sports or do activities that may cause you to get hit in the head. Ask your healthcare provider when you can return to sports.    Apply ice on your head for 15 to 20 minutes every hour or as directed. Use an ice pack, or put crushed ice in a plastic bag. Cover it with a towel before you apply it to your skin. Ice helps prevent tissue damage and decreases swelling and pain.    Have someone stay with you for 24 hours , or as directed. This person can monitor you for problems and call for help if needed. When you are awake, the person should ask you a few questions every few hours to see if you are thinking clearly. An example is to ask your name or address.  Prevent another head injury:    Wear a helmet that fits properly. Do this when you play sports, or ride a bike, scooter, or skateboard. Helmets help decrease your risk for a serious head injury. Talk to your healthcare provider about other ways you can protect yourself if you play sports.    Wear your seatbelt every time you are in a car. This helps lower your risk for a head injury if you are in a car accident.  Follow up with your doctor as directed: Write down your questions so you remember to ask them during your visits.    © [employer] 1973, 2024    	  back to top            © [employer] 1973, 2024

## 2024-03-14 NOTE — ED ADULT TRIAGE NOTE - CHIEF COMPLAINT QUOTE
Patient reports she slipped and bumped her left arm pain into a cabinet. Patient reports hx of left humerus fracture 13 weeks ago.

## 2024-03-14 NOTE — ED PROVIDER NOTE - OBJECTIVE STATEMENT
74-year-old woman, history of comminuted fracture to her left humerus 13 weeks ago and has been in physical therapy and recovery however today just prior to arrival she was on the floor working on something when she tried to stand up and then accidentally fell over, striking her in the left upper arm in the similar area where she had the prior fracture, and striking her right lower lateral forehead area.  She did not lose consciousness and has mild headache and also mild pain to her left upper arm.  She denies any dizziness/nausea/vomiting/focal weakness/numbness.  She is not on any anticoagulants.

## 2024-03-14 NOTE — ED PROVIDER NOTE - MUSCULOSKELETAL, MLM
Mild tenderness to left upper arm diffusely, no swelling/deformity, no tenderness/swelling/deformity to L shoulder/elbow/forearm/wrist/hand; NV intact

## 2024-03-15 VITALS
OXYGEN SATURATION: 98 % | DIASTOLIC BLOOD PRESSURE: 77 MMHG | HEART RATE: 88 BPM | SYSTOLIC BLOOD PRESSURE: 128 MMHG | TEMPERATURE: 98 F | RESPIRATION RATE: 18 BRPM

## 2024-03-15 PROCEDURE — 70450 CT HEAD/BRAIN W/O DYE: CPT | Mod: 26,MC

## 2024-03-18 ENCOUNTER — APPOINTMENT (OUTPATIENT)
Dept: ORTHOPEDIC SURGERY | Facility: CLINIC | Age: 74
End: 2024-03-18
Payer: MEDICARE

## 2024-03-18 ENCOUNTER — OUTPATIENT (OUTPATIENT)
Dept: OUTPATIENT SERVICES | Facility: HOSPITAL | Age: 74
LOS: 1 days | End: 2024-03-18
Payer: COMMERCIAL

## 2024-03-18 VITALS
HEIGHT: 61 IN | BODY MASS INDEX: 26.81 KG/M2 | WEIGHT: 142 LBS | DIASTOLIC BLOOD PRESSURE: 74 MMHG | SYSTOLIC BLOOD PRESSURE: 127 MMHG

## 2024-03-18 VITALS
DIASTOLIC BLOOD PRESSURE: 82 MMHG | WEIGHT: 139.99 LBS | OXYGEN SATURATION: 96 % | TEMPERATURE: 98 F | RESPIRATION RATE: 14 BRPM | HEIGHT: 59.25 IN | HEART RATE: 86 BPM | SYSTOLIC BLOOD PRESSURE: 135 MMHG

## 2024-03-18 DIAGNOSIS — S42.202A UNSPECIFIED FRACTURE OF UPPER END OF LEFT HUMERUS, INITIAL ENCOUNTER FOR CLOSED FRACTURE: ICD-10-CM

## 2024-03-18 DIAGNOSIS — Z01.818 ENCOUNTER FOR OTHER PREPROCEDURAL EXAMINATION: ICD-10-CM

## 2024-03-18 DIAGNOSIS — Z98.51 TUBAL LIGATION STATUS: Chronic | ICD-10-CM

## 2024-03-18 DIAGNOSIS — Z90.49 ACQUIRED ABSENCE OF OTHER SPECIFIED PARTS OF DIGESTIVE TRACT: Chronic | ICD-10-CM

## 2024-03-18 DIAGNOSIS — S82.142A DISPLACED BICONDYLAR FRACTURE OF LEFT TIBIA, INITIAL ENCOUNTER FOR CLOSED FRACTURE: Chronic | ICD-10-CM

## 2024-03-18 DIAGNOSIS — S42.209A UNSPECIFIED FRACTURE OF UPPER END OF UNSPECIFIED HUMERUS, INITIAL ENCOUNTER FOR CLOSED FRACTURE: ICD-10-CM

## 2024-03-18 DIAGNOSIS — S42.342A DISPLACED SPIRAL FRACTURE OF SHAFT OF HUMERUS, LEFT ARM, INITIAL ENCOUNTER FOR CLOSED FRACTURE: ICD-10-CM

## 2024-03-18 DIAGNOSIS — Z98.890 OTHER SPECIFIED POSTPROCEDURAL STATES: Chronic | ICD-10-CM

## 2024-03-18 LAB
ALBUMIN SERPL ELPH-MCNC: 3.6 G/DL — SIGNIFICANT CHANGE UP (ref 3.3–5)
ALP SERPL-CCNC: 68 U/L — SIGNIFICANT CHANGE UP (ref 30–120)
ALT FLD-CCNC: 21 U/L — SIGNIFICANT CHANGE UP (ref 10–60)
ANION GAP SERPL CALC-SCNC: 8 MMOL/L — SIGNIFICANT CHANGE UP (ref 5–17)
AST SERPL-CCNC: 15 U/L — SIGNIFICANT CHANGE UP (ref 10–40)
BILIRUB SERPL-MCNC: 0.4 MG/DL — SIGNIFICANT CHANGE UP (ref 0.2–1.2)
BUN SERPL-MCNC: 22 MG/DL — SIGNIFICANT CHANGE UP (ref 7–23)
CALCIUM SERPL-MCNC: 9.4 MG/DL — SIGNIFICANT CHANGE UP (ref 8.4–10.5)
CHLORIDE SERPL-SCNC: 103 MMOL/L — SIGNIFICANT CHANGE UP (ref 96–108)
CO2 SERPL-SCNC: 28 MMOL/L — SIGNIFICANT CHANGE UP (ref 22–31)
CREAT SERPL-MCNC: 0.64 MG/DL — SIGNIFICANT CHANGE UP (ref 0.5–1.3)
EGFR: 93 ML/MIN/1.73M2 — SIGNIFICANT CHANGE UP
GLUCOSE SERPL-MCNC: 94 MG/DL — SIGNIFICANT CHANGE UP (ref 70–99)
HCT VFR BLD CALC: 41.7 % — SIGNIFICANT CHANGE UP (ref 34.5–45)
HGB BLD-MCNC: 13.4 G/DL — SIGNIFICANT CHANGE UP (ref 11.5–15.5)
MCHC RBC-ENTMCNC: 29.5 PG — SIGNIFICANT CHANGE UP (ref 27–34)
MCHC RBC-ENTMCNC: 32.1 GM/DL — SIGNIFICANT CHANGE UP (ref 32–36)
MCV RBC AUTO: 91.6 FL — SIGNIFICANT CHANGE UP (ref 80–100)
NRBC # BLD: 0 /100 WBCS — SIGNIFICANT CHANGE UP (ref 0–0)
PLATELET # BLD AUTO: 263 K/UL — SIGNIFICANT CHANGE UP (ref 150–400)
POTASSIUM SERPL-MCNC: 4.1 MMOL/L — SIGNIFICANT CHANGE UP (ref 3.5–5.3)
POTASSIUM SERPL-SCNC: 4.1 MMOL/L — SIGNIFICANT CHANGE UP (ref 3.5–5.3)
PROT SERPL-MCNC: 7.8 G/DL — SIGNIFICANT CHANGE UP (ref 6–8.3)
RBC # BLD: 4.55 M/UL — SIGNIFICANT CHANGE UP (ref 3.8–5.2)
RBC # FLD: 12.7 % — SIGNIFICANT CHANGE UP (ref 10.3–14.5)
SODIUM SERPL-SCNC: 139 MMOL/L — SIGNIFICANT CHANGE UP (ref 135–145)
WBC # BLD: 8.23 K/UL — SIGNIFICANT CHANGE UP (ref 3.8–10.5)
WBC # FLD AUTO: 8.23 K/UL — SIGNIFICANT CHANGE UP (ref 3.8–10.5)

## 2024-03-18 PROCEDURE — 36415 COLL VENOUS BLD VENIPUNCTURE: CPT

## 2024-03-18 PROCEDURE — G0463: CPT

## 2024-03-18 PROCEDURE — 80053 COMPREHEN METABOLIC PANEL: CPT

## 2024-03-18 PROCEDURE — 99214 OFFICE O/P EST MOD 30 MIN: CPT

## 2024-03-18 PROCEDURE — 93010 ELECTROCARDIOGRAM REPORT: CPT

## 2024-03-18 PROCEDURE — 93005 ELECTROCARDIOGRAM TRACING: CPT

## 2024-03-18 PROCEDURE — 73060 X-RAY EXAM OF HUMERUS: CPT

## 2024-03-18 PROCEDURE — 85027 COMPLETE CBC AUTOMATED: CPT

## 2024-03-18 PROCEDURE — 99204 OFFICE O/P NEW MOD 45 MIN: CPT

## 2024-03-18 NOTE — H&P PST ADULT - NSICDXPROCEDURE_GEN_ALL_CORE_FT
PROCEDURES:  Open reduction and internal fixation (ORIF) of fracture of left proximal humerus 18-Mar-2024 13:21:09  Yee Montiel

## 2024-03-18 NOTE — H&P PST ADULT - HISTORY OF PRESENT ILLNESS
this is a 73 y/o female who fell twice 12/23 and 3/14/23 and fractured left humerus in 2 places; to have ORIF left humerus

## 2024-03-18 NOTE — H&P PST ADULT - NSICDXPASTSURGICALHX_GEN_ALL_CORE_FT
PAST SURGICAL HISTORY:  S/P laparoscopic cholecystectomy     S/P ORIF (open reduction internal fixation) fracture 2006- left tibial plateau    S/P right breast biopsy 1990- benign    S/P tubal ligation 1981    Tibial plateau fracture, left

## 2024-03-18 NOTE — H&P PST ADULT - NSICDXPASTMEDICALHX_GEN_ALL_CORE_FT
PAST MEDICAL HISTORY:  Dry eye syndrome of both eyes     H/O osteoporosis     Vitamin D deficiency      PAST MEDICAL HISTORY:  Dry eye syndrome of both eyes     GERD (gastroesophageal reflux disease)     H/O osteoporosis     Vitamin D deficiency

## 2024-03-19 ENCOUNTER — APPOINTMENT (OUTPATIENT)
Dept: INTERNAL MEDICINE | Facility: CLINIC | Age: 74
End: 2024-03-19
Payer: MEDICARE

## 2024-03-19 VITALS
WEIGHT: 142 LBS | BODY MASS INDEX: 26.81 KG/M2 | SYSTOLIC BLOOD PRESSURE: 120 MMHG | HEIGHT: 61 IN | HEART RATE: 96 BPM | DIASTOLIC BLOOD PRESSURE: 68 MMHG | TEMPERATURE: 98.7 F | OXYGEN SATURATION: 97 %

## 2024-03-19 DIAGNOSIS — Z01.818 ENCOUNTER FOR OTHER PREPROCEDURAL EXAMINATION: ICD-10-CM

## 2024-03-19 PROCEDURE — 99213 OFFICE O/P EST LOW 20 MIN: CPT

## 2024-03-20 PROBLEM — Z01.818 PREOPERATIVE CLEARANCE: Status: ACTIVE | Noted: 2024-03-20

## 2024-03-23 NOTE — HISTORY OF PRESENT ILLNESS
[No Pertinent Cardiac History] : no history of aortic stenosis, atrial fibrillation, coronary artery disease, recent myocardial infarction, or implantable device/pacemaker [No Pertinent Pulmonary History] : no history of asthma, COPD, sleep apnea, or smoking [No Adverse Anesthesia Reaction] : no adverse anesthesia reaction in self or family member [(Patient denies any chest pain, claudication, dyspnea on exertion, orthopnea, palpitations or syncope)] : Patient denies any chest pain, claudication, dyspnea on exertion, orthopnea, palpitations or syncope [Aortic Stenosis] : no aortic stenosis [Atrial Fibrillation] : no atrial fibrillation [Recent Myocardial Infarction] : no recent myocardial infarction [Implantable Device/Pacemaker] : no implantable device/pacemaker [Coronary Artery Disease] : no coronary artery disease [COPD] : no COPD [Asthma] : no asthma [Family Member] : no family member with adverse anesthesia reaction/sudden death [Sleep Apnea] : no sleep apnea [Smoker] : not a smoker [Self] : no previous adverse anesthesia reaction [Chronic Anticoagulation] : no chronic anticoagulation [Diabetes] : no diabetes [FreeTextEntry1] : LEFT HUMERUS [Chronic Kidney Disease] : no chronic kidney disease [FreeTextEntry3] : DR.BARUCH MARTÍNEZ [FreeTextEntry4] : FEELING WELL TODAY [FreeTextEntry2] : 3/29/24

## 2024-03-23 NOTE — PHYSICAL EXAM
[No Acute Distress] : no acute distress [Well Nourished] : well nourished [Well Developed] : well developed [Well-Appearing] : well-appearing [Normal Sclera/Conjunctiva] : normal sclera/conjunctiva [PERRL] : pupils equal round and reactive to light [EOMI] : extraocular movements intact [Normal Outer Ear/Nose] : the outer ears and nose were normal in appearance [Normal Oropharynx] : the oropharynx was normal [No JVD] : no jugular venous distention [No Lymphadenopathy] : no lymphadenopathy [Supple] : supple [Thyroid Normal, No Nodules] : the thyroid was normal and there were no nodules present [No Respiratory Distress] : no respiratory distress  [No Accessory Muscle Use] : no accessory muscle use [Clear to Auscultation] : lungs were clear to auscultation bilaterally [Normal Rate] : normal rate  [Regular Rhythm] : with a regular rhythm [No Murmur] : no murmur heard [Normal S1, S2] : normal S1 and S2 [No Carotid Bruits] : no carotid bruits [No Abdominal Bruit] : a ~M bruit was not heard ~T in the abdomen [No Varicosities] : no varicosities [Pedal Pulses Present] : the pedal pulses are present [No Edema] : there was no peripheral edema [No Palpable Aorta] : no palpable aorta [No Extremity Clubbing/Cyanosis] : no extremity clubbing/cyanosis [Soft] : abdomen soft [Non-distended] : non-distended [Non Tender] : non-tender [No Masses] : no abdominal mass palpated [No HSM] : no HSM [Normal Bowel Sounds] : normal bowel sounds [Normal Posterior Cervical Nodes] : no posterior cervical lymphadenopathy [Normal Anterior Cervical Nodes] : no anterior cervical lymphadenopathy [No CVA Tenderness] : no CVA  tenderness [No Spinal Tenderness] : no spinal tenderness [No Joint Swelling] : no joint swelling [Grossly Normal Strength/Tone] : grossly normal strength/tone [No Rash] : no rash [Coordination Grossly Intact] : coordination grossly intact [No Focal Deficits] : no focal deficits [Normal Gait] : normal gait [Deep Tendon Reflexes (DTR)] : deep tendon reflexes were 2+ and symmetric [Normal Affect] : the affect was normal [Normal Insight/Judgement] : insight and judgment were intact [Alert and Oriented x3] : oriented to person, place, and time [Normal Mood] : the mood was normal [de-identified] : LEFT HUMERUS INJURY

## 2024-03-23 NOTE — RESULTS/DATA
[] : results reviewed [de-identified] : WNL [de-identified] : WNL [de-identified] : NSR,NO CHANGES

## 2024-03-28 ENCOUNTER — TRANSCRIPTION ENCOUNTER (OUTPATIENT)
Age: 74
End: 2024-03-28

## 2024-03-29 ENCOUNTER — TRANSCRIPTION ENCOUNTER (OUTPATIENT)
Age: 74
End: 2024-03-29

## 2024-03-29 ENCOUNTER — OUTPATIENT (OUTPATIENT)
Dept: OUTPATIENT SERVICES | Facility: HOSPITAL | Age: 74
LOS: 1 days | End: 2024-03-29
Payer: COMMERCIAL

## 2024-03-29 ENCOUNTER — APPOINTMENT (OUTPATIENT)
Dept: ORTHOPEDIC SURGERY | Facility: HOSPITAL | Age: 74
End: 2024-03-29

## 2024-03-29 VITALS
TEMPERATURE: 98 F | SYSTOLIC BLOOD PRESSURE: 140 MMHG | WEIGHT: 138.45 LBS | RESPIRATION RATE: 22 BRPM | HEIGHT: 59 IN | DIASTOLIC BLOOD PRESSURE: 71 MMHG | HEART RATE: 81 BPM | OXYGEN SATURATION: 100 %

## 2024-03-29 VITALS
TEMPERATURE: 98 F | DIASTOLIC BLOOD PRESSURE: 82 MMHG | OXYGEN SATURATION: 98 % | HEART RATE: 78 BPM | SYSTOLIC BLOOD PRESSURE: 140 MMHG | RESPIRATION RATE: 19 BRPM

## 2024-03-29 DIAGNOSIS — Z98.51 TUBAL LIGATION STATUS: Chronic | ICD-10-CM

## 2024-03-29 DIAGNOSIS — S82.142A DISPLACED BICONDYLAR FRACTURE OF LEFT TIBIA, INITIAL ENCOUNTER FOR CLOSED FRACTURE: Chronic | ICD-10-CM

## 2024-03-29 DIAGNOSIS — Z90.49 ACQUIRED ABSENCE OF OTHER SPECIFIED PARTS OF DIGESTIVE TRACT: Chronic | ICD-10-CM

## 2024-03-29 DIAGNOSIS — Z98.890 OTHER SPECIFIED POSTPROCEDURAL STATES: Chronic | ICD-10-CM

## 2024-03-29 DIAGNOSIS — S42.209A UNSPECIFIED FRACTURE OF UPPER END OF UNSPECIFIED HUMERUS, INITIAL ENCOUNTER FOR CLOSED FRACTURE: ICD-10-CM

## 2024-03-29 PROCEDURE — 24515 OPTX HUMRL SHFT FX PLATE/SCR: CPT | Mod: AS,RT

## 2024-03-29 PROCEDURE — 24515 OPTX HUMRL SHFT FX PLATE/SCR: CPT | Mod: RT

## 2024-03-29 PROCEDURE — 23615 OPTX PROX HUMRL FX W/INT FIX: CPT | Mod: LT

## 2024-03-29 PROCEDURE — 23615 OPTX PROX HUMRL FX W/INT FIX: CPT | Mod: AS,LT

## 2024-03-29 PROCEDURE — C1713: CPT

## 2024-03-29 PROCEDURE — C1889: CPT

## 2024-03-29 PROCEDURE — 76000 FLUOROSCOPY <1 HR PHYS/QHP: CPT

## 2024-03-29 DEVICE — SCREW LOCKING SLF-TPNG W/ STARDRIVE RECESS 3.5X40MM: Type: IMPLANTABLE DEVICE | Site: LEFT | Status: FUNCTIONAL

## 2024-03-29 DEVICE — PLATE LCP 8H 3.5X111MM: Type: IMPLANTABLE DEVICE | Site: LEFT | Status: FUNCTIONAL

## 2024-03-29 DEVICE — SURGICEL 2 X 14": Type: IMPLANTABLE DEVICE | Site: LEFT | Status: FUNCTIONAL

## 2024-03-29 DEVICE — IMPLANTABLE DEVICE: Type: IMPLANTABLE DEVICE | Site: LEFT | Status: FUNCTIONAL

## 2024-03-29 DEVICE — SCREW CORT S-T 3.5X26MM: Type: IMPLANTABLE DEVICE | Site: LEFT | Status: FUNCTIONAL

## 2024-03-29 DEVICE — SCREW CORT S-T 3.5X28MM: Type: IMPLANTABLE DEVICE | Site: LEFT | Status: FUNCTIONAL

## 2024-03-29 DEVICE — SCREW CANC 3.5X42: Type: IMPLANTABLE DEVICE | Site: LEFT | Status: FUNCTIONAL

## 2024-03-29 DEVICE — SCREW CORTEX S-T 2.7X22MM: Type: IMPLANTABLE DEVICE | Site: LEFT | Status: FUNCTIONAL

## 2024-03-29 DEVICE — SCREW LOKG SLF-TPNG W/ STARDRIVE RECESS 3.5X30MM: Type: IMPLANTABLE DEVICE | Site: LEFT | Status: FUNCTIONAL

## 2024-03-29 DEVICE — SCREW LOKG SLF-TPNG W/ STARDRIVE RECESS 3.5X22MM: Type: IMPLANTABLE DEVICE | Site: LEFT | Status: FUNCTIONAL

## 2024-03-29 DEVICE — SCREW CORT S-T 3.5X22MM: Type: IMPLANTABLE DEVICE | Site: LEFT | Status: FUNCTIONAL

## 2024-03-29 DEVICE — SCREW CORT S-T 3.5X30MM: Type: IMPLANTABLE DEVICE | Site: LEFT | Status: FUNCTIONAL

## 2024-03-29 DEVICE — KWIRE TRC PT 2X150MM: Type: IMPLANTABLE DEVICE | Site: LEFT | Status: FUNCTIONAL

## 2024-03-29 DEVICE — PLATE LCP 10H 3.5X137MM: Type: IMPLANTABLE DEVICE | Site: LEFT | Status: FUNCTIONAL

## 2024-03-29 DEVICE — SCREW CORT S-T 3.5X24MM: Type: IMPLANTABLE DEVICE | Site: LEFT | Status: FUNCTIONAL

## 2024-03-29 DEVICE — SCR LOK S-T 3.5X28MM SS: Type: IMPLANTABLE DEVICE | Site: LEFT | Status: FUNCTIONAL

## 2024-03-29 DEVICE — SCREW LOKG SLF-TPNG W/ STARDRIVE RECESS 3.5X20MM: Type: IMPLANTABLE DEVICE | Site: LEFT | Status: FUNCTIONAL

## 2024-03-29 DEVICE — SURGICEL NU-KNIT 6 X 9": Type: IMPLANTABLE DEVICE | Site: LEFT | Status: FUNCTIONAL

## 2024-03-29 RX ORDER — IBUPROFEN 200 MG
1 TABLET ORAL
Qty: 21 | Refills: 0
Start: 2024-03-29 | End: 2024-04-04

## 2024-03-29 RX ORDER — CEFAZOLIN SODIUM 1 G
2000 VIAL (EA) INJECTION ONCE
Refills: 0 | Status: COMPLETED | OUTPATIENT
Start: 2024-03-29 | End: 2024-03-29

## 2024-03-29 RX ORDER — OXYCODONE AND ACETAMINOPHEN 5; 325 MG/1; MG/1
1 TABLET ORAL
Qty: 20 | Refills: 0
Start: 2024-03-29

## 2024-03-29 RX ORDER — APREPITANT 80 MG/1
40 CAPSULE ORAL ONCE
Refills: 0 | Status: COMPLETED | OUTPATIENT
Start: 2024-03-29 | End: 2024-03-29

## 2024-03-29 RX ORDER — ONDANSETRON 8 MG/1
4 TABLET, FILM COATED ORAL ONCE
Refills: 0 | Status: DISCONTINUED | OUTPATIENT
Start: 2024-03-29 | End: 2024-04-12

## 2024-03-29 RX ORDER — SODIUM CHLORIDE 9 MG/ML
1000 INJECTION, SOLUTION INTRAVENOUS
Refills: 0 | Status: DISCONTINUED | OUTPATIENT
Start: 2024-03-29 | End: 2024-04-12

## 2024-03-29 RX ORDER — HYDROMORPHONE HYDROCHLORIDE 2 MG/ML
1 INJECTION INTRAMUSCULAR; INTRAVENOUS; SUBCUTANEOUS
Refills: 0 | Status: DISCONTINUED | OUTPATIENT
Start: 2024-03-29 | End: 2024-03-29

## 2024-03-29 RX ORDER — CHLORHEXIDINE GLUCONATE 213 G/1000ML
1 SOLUTION TOPICAL ONCE
Refills: 0 | Status: COMPLETED | OUTPATIENT
Start: 2024-03-29 | End: 2024-03-29

## 2024-03-29 RX ORDER — ASCORBIC ACID 60 MG
1 TABLET,CHEWABLE ORAL
Qty: 0 | Refills: 0 | DISCHARGE

## 2024-03-29 RX ORDER — OXYCODONE HYDROCHLORIDE 5 MG/1
5 TABLET ORAL ONCE
Refills: 0 | Status: DISCONTINUED | OUTPATIENT
Start: 2024-03-29 | End: 2024-03-29

## 2024-03-29 RX ORDER — HYDROMORPHONE HYDROCHLORIDE 2 MG/ML
0.5 INJECTION INTRAMUSCULAR; INTRAVENOUS; SUBCUTANEOUS
Refills: 0 | Status: DISCONTINUED | OUTPATIENT
Start: 2024-03-29 | End: 2024-03-29

## 2024-03-29 RX ORDER — TRAMADOL HYDROCHLORIDE 50 MG/1
1 TABLET ORAL
Qty: 20 | Refills: 0
Start: 2024-03-29

## 2024-03-29 RX ORDER — CHOLECALCIFEROL (VITAMIN D3) 125 MCG
1 CAPSULE ORAL
Qty: 0 | Refills: 0 | DISCHARGE

## 2024-03-29 RX ADMIN — APREPITANT 40 MILLIGRAM(S): 80 CAPSULE ORAL at 10:44

## 2024-03-29 RX ADMIN — CHLORHEXIDINE GLUCONATE 1 APPLICATION(S): 213 SOLUTION TOPICAL at 10:44

## 2024-03-29 NOTE — ASU DISCHARGE PLAN (ADULT/PEDIATRIC) - NS MD DC FALL RISK RISK
For information on Fall & Injury Prevention, visit: [email or web address] OR  [email or web address] OR  https://www.cdc.gov/steadi/patient.html

## 2024-03-29 NOTE — ASU DISCHARGE PLAN (ADULT/PEDIATRIC) - ASU DC SPECIAL INSTRUCTIONSFT
rest  ice  elevate  wiggle fingers wrist and hand  keep area clean and dry   Follow up Dr. Aburto office call office to confirm  Follow up primary care MD pearce for comfort rest  ice  elevate  wiggle fingers wrist and hand  keep area clean and dry   Follow up Dr. Aburto office call office to confirm  Follow up primary care MD  sling for comfort take sling off and move arm at home   tylenol up  to 3000mg a day   percocet  has 325mg  of tylenol in it   may take one 500mg tablet of tylenol with percocet  narcotic pain medications as needed  NON - weight bearing on  operated extremity rest  ice  elevate  wiggle fingers wrist and hand  keep area clean and dry   Follow up Dr. Aburto office call office to confirm  Follow up primary care MD  sling for comfort take sling off and move arm at home   tylenol up  to 3000mg a day     may take 2 500mg tablet of tylenol every  8 hours as needed for mild pain  narcotic pain medications as needed  NON - weight bearing on  operated extremity

## 2024-03-29 NOTE — ASU PREOP CHECKLIST - PATIENT'S PERSONAL PROPERTY REMOVED
[FreeTextEntry1] : Well growing 4-month-old ex-preemie. Followed by early intervention because of extreme prematurity and developmental delays.Anticipatory guidance- 4 mo If formula is needed, recommend iron-fortified formulations, 4-6 oz every 3-4 hrs. try and encourage baby feed every after 4 hours. He falls asleep during the feeding then switche to a stage II nipple size for the bottles. When in car, patient should be in rear-facing car seat in back seat. Put baby to sleep on back, in own crib with no loose or soft bedding. Lower crib matress. Help baby to maintain sleep and feeding routines. May offer pacifier if needed. Continue tummy time when awake. Postpone solid foods until 5-6 months.Keep appointments with ophthalmology and early intervention. Return to office in one month\par  cell phone wrist watch ID card wallet to security/glasses

## 2024-03-29 NOTE — ASU PATIENT PROFILE, ADULT - FALL HARM RISK - HARM RISK INTERVENTIONS

## 2024-03-29 NOTE — ASU PREOP CHECKLIST - DENTURES
Laser Type: Goldy Icon Laser
Post-Care Instructions: I reviewed with the patient in detail post-care instructions. Patient should stay away from the sun and wear sun protection until treated areas are fully healed.
Anesthesia Type: 1% lidocaine with epinephrine
Laser Type: Lovell 50 CO2 Laser
Immediate Endpoint: erythema
Spot Sizes: 3mm
Feathering Statement: Following the treatment the wound edges were feathered using 260mJ.
Include Z78.9 (Other Specified Conditions Influencing Health Status) As An Associated Diagnosis?: No
Fluence: 10
Show Local Anesthesia: Yes
External Cooling Fan Speed: 0
Power (Price-Leave At Zero If Unwanted): 40
Wavelength: 10,600nm
Medical Necessity Information: It is in your best interest to select a reason for this procedure from the list below. All of these items fulfill various CMS LCD requirements except the new and changing color options.
Passes: several
Cryogen Time (Ms): 30
Detail Level: Zone
Spot Size: 15 mm X 35 mm
Delay Time (Ms): 20
Medical Necessity Clause: This procedure was medically necessary because the lesions that were treated were:
Laser Mode: Fractionated
Pulse Duration: 20 ms
Post Procedure Text: Cooled triamcinolone and ice applied. Post care reviewed with patient.
Consent: Written consent obtained, risks reviewed including but not limited to crusting, scabbing, blistering, scarring, darker or lighter pigmentary change, incidental hair removal, bruising, and/or incomplete removal.
Total Pulses: 36
no
Energy(Mj-Leave At Zero If Unwanted): 500
Square Area Of Lesion: 20

## 2024-03-29 NOTE — BRIEF OPERATIVE NOTE - NSICDXBRIEFPREOP_GEN_ALL_CORE_FT
PRE-OP DIAGNOSIS:  Humerus head fracture, left, closed, initial encounter 29-Mar-2024 16:05:46  Ney Byrd  Fracture, humerus, shaft 29-Mar-2024 16:06:10  Ney Byrd

## 2024-03-29 NOTE — ASU DISCHARGE PLAN (ADULT/PEDIATRIC) - CARE PROVIDER_API CALL
Sami Aburto  Orthopaedic Surgery  [address]  [place] [address]  Phone: [phone]  Fax: [phone]  Follow Up Time: 2 weeks

## 2024-04-01 PROBLEM — Z78.9 OTHER SPECIFIED HEALTH STATUS: Chronic | Status: ACTIVE | Noted: 2024-03-14

## 2024-04-01 PROBLEM — Z87.39 PERSONAL HISTORY OF OTHER DISEASES OF THE MUSCULOSKELETAL SYSTEM AND CONNECTIVE TISSUE: Chronic | Status: ACTIVE | Noted: 2024-03-18

## 2024-04-01 PROBLEM — K21.9 GASTRO-ESOPHAGEAL REFLUX DISEASE WITHOUT ESOPHAGITIS: Chronic | Status: ACTIVE | Noted: 2024-03-18

## 2024-04-09 ENCOUNTER — APPOINTMENT (OUTPATIENT)
Dept: ORTHOPEDIC SURGERY | Facility: CLINIC | Age: 74
End: 2024-04-09
Payer: MEDICARE

## 2024-04-09 DIAGNOSIS — S42.202A UNSPECIFIED FRACTURE OF UPPER END OF LEFT HUMERUS, INITIAL ENCOUNTER FOR CLOSED FRACTURE: ICD-10-CM

## 2024-04-09 PROCEDURE — 99024 POSTOP FOLLOW-UP VISIT: CPT

## 2024-04-09 PROCEDURE — 73030 X-RAY EXAM OF SHOULDER: CPT | Mod: LT

## 2024-05-02 ENCOUNTER — APPOINTMENT (OUTPATIENT)
Dept: ORTHOPEDIC SURGERY | Facility: CLINIC | Age: 74
End: 2024-05-02

## 2024-05-23 ENCOUNTER — APPOINTMENT (OUTPATIENT)
Dept: ORTHOPEDIC SURGERY | Facility: CLINIC | Age: 74
End: 2024-05-23
Payer: MEDICARE

## 2024-05-23 DIAGNOSIS — S42.231D: ICD-10-CM

## 2024-05-23 DIAGNOSIS — S42.209A UNSPECIFIED FRACTURE OF UPPER END OF UNSPECIFIED HUMERUS, INITIAL ENCOUNTER FOR CLOSED FRACTURE: ICD-10-CM

## 2024-05-23 PROCEDURE — 99024 POSTOP FOLLOW-UP VISIT: CPT

## 2024-05-23 PROCEDURE — 73030 X-RAY EXAM OF SHOULDER: CPT | Mod: LT

## 2024-06-13 ENCOUNTER — APPOINTMENT (OUTPATIENT)
Dept: CARDIOLOGY | Facility: CLINIC | Age: 74
End: 2024-06-13

## 2024-07-16 ENCOUNTER — NON-APPOINTMENT (OUTPATIENT)
Age: 74
End: 2024-07-16

## 2024-07-16 ENCOUNTER — APPOINTMENT (OUTPATIENT)
Dept: CARDIOLOGY | Facility: CLINIC | Age: 74
End: 2024-07-16
Payer: MEDICARE

## 2024-07-16 VITALS
OXYGEN SATURATION: 94 % | SYSTOLIC BLOOD PRESSURE: 105 MMHG | HEART RATE: 81 BPM | BODY MASS INDEX: 26.45 KG/M2 | DIASTOLIC BLOOD PRESSURE: 68 MMHG | WEIGHT: 140 LBS

## 2024-07-16 DIAGNOSIS — R00.2 PALPITATIONS: ICD-10-CM

## 2024-07-16 DIAGNOSIS — E78.00 PURE HYPERCHOLESTEROLEMIA, UNSPECIFIED: ICD-10-CM

## 2024-07-16 PROCEDURE — 99213 OFFICE O/P EST LOW 20 MIN: CPT

## 2024-07-16 PROCEDURE — 93000 ELECTROCARDIOGRAM COMPLETE: CPT

## 2024-07-16 PROCEDURE — G2211 COMPLEX E/M VISIT ADD ON: CPT

## 2024-08-19 ENCOUNTER — APPOINTMENT (OUTPATIENT)
Dept: INTERNAL MEDICINE | Facility: CLINIC | Age: 74
End: 2024-08-19

## 2024-09-23 ENCOUNTER — APPOINTMENT (OUTPATIENT)
Dept: ORTHOPEDIC SURGERY | Facility: CLINIC | Age: 74
End: 2024-09-23
Payer: MEDICARE

## 2024-09-23 VITALS — HEIGHT: 60 IN | WEIGHT: 140 LBS | BODY MASS INDEX: 27.48 KG/M2

## 2024-09-23 DIAGNOSIS — M65.312 TRIGGER THUMB, LEFT THUMB: ICD-10-CM

## 2024-09-23 DIAGNOSIS — S42.342A DISPLACED SPIRAL FRACTURE OF SHAFT OF HUMERUS, LEFT ARM, INITIAL ENCOUNTER FOR CLOSED FRACTURE: ICD-10-CM

## 2024-09-23 DIAGNOSIS — S42.209A UNSPECIFIED FRACTURE OF UPPER END OF UNSPECIFIED HUMERUS, INITIAL ENCOUNTER FOR CLOSED FRACTURE: ICD-10-CM

## 2024-09-23 PROCEDURE — 73030 X-RAY EXAM OF SHOULDER: CPT | Mod: LT

## 2024-09-23 PROCEDURE — 99213 OFFICE O/P EST LOW 20 MIN: CPT | Mod: 25

## 2024-09-23 PROCEDURE — 20550 NJX 1 TENDON SHEATH/LIGAMENT: CPT | Mod: LT

## 2024-09-23 NOTE — PHYSICAL EXAM
[de-identified] : Patient is WDWN, alert, and in no acute distress. Breathing is unlabored. She is grossly oriented to person, place, and time.  Left Shoulder:  Incision is healing well. There are no signs of infection. Normal amount of postoperative edema and tenderness present. Flexion 95, abduction 90  Left thumb: Tender over A1 pulley Clicking on thumb IP flexion Normal sensation [de-identified] : AP, lateral and oblique views of the Left Shoulder were obtained today and revealed a left proximal fracture with hardware in place. The fracture is perfectly aligned and healed.

## 2024-09-23 NOTE — HISTORY OF PRESENT ILLNESS
[FreeTextEntry1] :  The patient is a 74 year female who returns for the 2nd postoperative visit after undergoing ORIF of left proximal humerus at [facility]. The surgery was on 03/29/2024. The patient is recovering at home. She reports no postoperative pain. She has been working on gentle ROM and has discontinued the sling. Overall, very pleased with her outcome thus far.

## 2024-09-23 NOTE — DISCUSSION/SUMMARY
[de-identified] : The underlying pathophysiology was reviewed with the patient. XR films were reviewed with the patient. Discussed at length the nature of the patient's condition.  Patient was advised to take OTC medications and topical analgesic for pain management.  The patient was encouraged to take Calcium Citrate, Vitamin D3 and Vitamin C along with a high calcium diet is advised to promote bone health and healing. She was given a steroid injection.).5cc lidocaine, 0.25cc dexamethasone and 0.25cc [id number] was given in the left FPL tendon sheath at the A1 pulley area. Patient was advised to use the shoulder as much as she can to help with the pain management. She was advised to return to her baseline tasks gradually with no restrictions.  All questions answered, understanding verbalized. Patient in agreement with plan of care.  Patient was advised to follow up in 46months.

## 2024-12-16 ENCOUNTER — APPOINTMENT (OUTPATIENT)
Dept: INTERNAL MEDICINE | Facility: CLINIC | Age: 74
End: 2024-12-16

## 2025-01-23 DIAGNOSIS — S42.209A UNSPECIFIED FRACTURE OF UPPER END OF UNSPECIFIED HUMERUS, INITIAL ENCOUNTER FOR CLOSED FRACTURE: ICD-10-CM

## 2025-02-10 ENCOUNTER — APPOINTMENT (OUTPATIENT)
Dept: ORTHOPEDIC SURGERY | Facility: CLINIC | Age: 75
End: 2025-02-10
Payer: MEDICARE

## 2025-02-10 VITALS — BODY MASS INDEX: 27.48 KG/M2 | WEIGHT: 140 LBS | HEIGHT: 60 IN

## 2025-02-10 DIAGNOSIS — S42.209A UNSPECIFIED FRACTURE OF UPPER END OF UNSPECIFIED HUMERUS, INITIAL ENCOUNTER FOR CLOSED FRACTURE: ICD-10-CM

## 2025-02-10 DIAGNOSIS — S42.202A UNSPECIFIED FRACTURE OF UPPER END OF LEFT HUMERUS, INITIAL ENCOUNTER FOR CLOSED FRACTURE: ICD-10-CM

## 2025-02-10 DIAGNOSIS — R26.89 OTHER ABNORMALITIES OF GAIT AND MOBILITY: ICD-10-CM

## 2025-02-10 PROCEDURE — 99213 OFFICE O/P EST LOW 20 MIN: CPT

## 2025-02-10 PROCEDURE — 73030 X-RAY EXAM OF SHOULDER: CPT | Mod: LT

## 2025-03-11 ENCOUNTER — APPOINTMENT (OUTPATIENT)
Dept: CARDIOLOGY | Facility: CLINIC | Age: 75
End: 2025-03-11
Payer: MEDICARE

## 2025-03-11 ENCOUNTER — NON-APPOINTMENT (OUTPATIENT)
Age: 75
End: 2025-03-11

## 2025-03-11 VITALS
SYSTOLIC BLOOD PRESSURE: 110 MMHG | BODY MASS INDEX: 27.09 KG/M2 | HEART RATE: 73 BPM | DIASTOLIC BLOOD PRESSURE: 66 MMHG | WEIGHT: 138 LBS | HEIGHT: 60 IN | OXYGEN SATURATION: 99 %

## 2025-03-11 DIAGNOSIS — R00.2 PALPITATIONS: ICD-10-CM

## 2025-03-11 DIAGNOSIS — E78.00 PURE HYPERCHOLESTEROLEMIA, UNSPECIFIED: ICD-10-CM

## 2025-03-11 DIAGNOSIS — I49.3 VENTRICULAR PREMATURE DEPOLARIZATION: ICD-10-CM

## 2025-03-11 PROCEDURE — 93000 ELECTROCARDIOGRAM COMPLETE: CPT

## 2025-03-11 PROCEDURE — 99215 OFFICE O/P EST HI 40 MIN: CPT

## 2025-03-11 PROCEDURE — G2211 COMPLEX E/M VISIT ADD ON: CPT

## 2025-03-25 ENCOUNTER — EMERGENCY (EMERGENCY)
Facility: HOSPITAL | Age: 75
LOS: 1 days | Discharge: HOME | End: 2025-03-25
Attending: EMERGENCY MEDICINE
Payer: COMMERCIAL

## 2025-03-25 VITALS
TEMPERATURE: 98 F | RESPIRATION RATE: 16 BRPM | HEIGHT: 60 IN | OXYGEN SATURATION: 99 % | WEIGHT: 138.01 LBS | SYSTOLIC BLOOD PRESSURE: 134 MMHG | DIASTOLIC BLOOD PRESSURE: 84 MMHG | HEART RATE: 74 BPM

## 2025-03-25 VITALS
DIASTOLIC BLOOD PRESSURE: 76 MMHG | HEART RATE: 76 BPM | OXYGEN SATURATION: 97 % | SYSTOLIC BLOOD PRESSURE: 125 MMHG | TEMPERATURE: 99 F | RESPIRATION RATE: 18 BRPM

## 2025-03-25 DIAGNOSIS — Z98.890 OTHER SPECIFIED POSTPROCEDURAL STATES: Chronic | ICD-10-CM

## 2025-03-25 DIAGNOSIS — Z90.49 ACQUIRED ABSENCE OF OTHER SPECIFIED PARTS OF DIGESTIVE TRACT: Chronic | ICD-10-CM

## 2025-03-25 DIAGNOSIS — S82.142A DISPLACED BICONDYLAR FRACTURE OF LEFT TIBIA, INITIAL ENCOUNTER FOR CLOSED FRACTURE: Chronic | ICD-10-CM

## 2025-03-25 DIAGNOSIS — Z98.51 TUBAL LIGATION STATUS: Chronic | ICD-10-CM

## 2025-03-25 LAB
ALBUMIN SERPL ELPH-MCNC: 4.2 G/DL — SIGNIFICANT CHANGE UP (ref 3.3–5)
ALP SERPL-CCNC: 74 U/L — SIGNIFICANT CHANGE UP (ref 40–120)
ALT FLD-CCNC: 17 U/L — SIGNIFICANT CHANGE UP (ref 10–45)
ANION GAP SERPL CALC-SCNC: 14 MMOL/L — SIGNIFICANT CHANGE UP (ref 5–17)
AST SERPL-CCNC: 23 U/L — SIGNIFICANT CHANGE UP (ref 10–40)
BASOPHILS # BLD AUTO: 0.02 K/UL — SIGNIFICANT CHANGE UP (ref 0–0.2)
BASOPHILS NFR BLD AUTO: 0.2 % — SIGNIFICANT CHANGE UP (ref 0–2)
BILIRUB SERPL-MCNC: 0.3 MG/DL — SIGNIFICANT CHANGE UP (ref 0.2–1.2)
BUN SERPL-MCNC: 14 MG/DL — SIGNIFICANT CHANGE UP (ref 7–23)
CALCIUM SERPL-MCNC: 9.6 MG/DL — SIGNIFICANT CHANGE UP (ref 8.4–10.5)
CHLORIDE SERPL-SCNC: 103 MMOL/L — SIGNIFICANT CHANGE UP (ref 96–108)
CO2 SERPL-SCNC: 22 MMOL/L — SIGNIFICANT CHANGE UP (ref 22–31)
CREAT SERPL-MCNC: 0.48 MG/DL — LOW (ref 0.5–1.3)
EGFR: 99 ML/MIN/1.73M2 — SIGNIFICANT CHANGE UP
EGFR: 99 ML/MIN/1.73M2 — SIGNIFICANT CHANGE UP
EOSINOPHIL # BLD AUTO: 0 K/UL — SIGNIFICANT CHANGE UP (ref 0–0.5)
EOSINOPHIL NFR BLD AUTO: 0 % — SIGNIFICANT CHANGE UP (ref 0–6)
GLUCOSE SERPL-MCNC: 118 MG/DL — HIGH (ref 70–99)
HCT VFR BLD CALC: 42.6 % — SIGNIFICANT CHANGE UP (ref 34.5–45)
HGB BLD-MCNC: 14 G/DL — SIGNIFICANT CHANGE UP (ref 11.5–15.5)
IMM GRANULOCYTES NFR BLD AUTO: 0.6 % — SIGNIFICANT CHANGE UP (ref 0–0.9)
LIDOCAIN IGE QN: 22 U/L — SIGNIFICANT CHANGE UP (ref 7–60)
LYMPHOCYTES # BLD AUTO: 0.51 K/UL — LOW (ref 1–3.3)
LYMPHOCYTES # BLD AUTO: 6 % — LOW (ref 13–44)
MCHC RBC-ENTMCNC: 30.1 PG — SIGNIFICANT CHANGE UP (ref 27–34)
MCHC RBC-ENTMCNC: 32.9 G/DL — SIGNIFICANT CHANGE UP (ref 32–36)
MCV RBC AUTO: 91.6 FL — SIGNIFICANT CHANGE UP (ref 80–100)
MONOCYTES # BLD AUTO: 0.13 K/UL — SIGNIFICANT CHANGE UP (ref 0–0.9)
MONOCYTES NFR BLD AUTO: 1.5 % — LOW (ref 2–14)
NEUTROPHILS # BLD AUTO: 7.8 K/UL — HIGH (ref 1.8–7.4)
NEUTROPHILS NFR BLD AUTO: 91.7 % — HIGH (ref 43–77)
NRBC BLD AUTO-RTO: 0 /100 WBCS — SIGNIFICANT CHANGE UP (ref 0–0)
PLATELET # BLD AUTO: 240 K/UL — SIGNIFICANT CHANGE UP (ref 150–400)
POTASSIUM SERPL-MCNC: 4.3 MMOL/L — SIGNIFICANT CHANGE UP (ref 3.5–5.3)
POTASSIUM SERPL-SCNC: 4.3 MMOL/L — SIGNIFICANT CHANGE UP (ref 3.5–5.3)
PROT SERPL-MCNC: 7.7 G/DL — SIGNIFICANT CHANGE UP (ref 6–8.3)
RBC # BLD: 4.65 M/UL — SIGNIFICANT CHANGE UP (ref 3.8–5.2)
RBC # FLD: 13.6 % — SIGNIFICANT CHANGE UP (ref 10.3–14.5)
SODIUM SERPL-SCNC: 139 MMOL/L — SIGNIFICANT CHANGE UP (ref 135–145)
WBC # BLD: 8.51 K/UL — SIGNIFICANT CHANGE UP (ref 3.8–10.5)
WBC # FLD AUTO: 8.51 K/UL — SIGNIFICANT CHANGE UP (ref 3.8–10.5)

## 2025-03-25 PROCEDURE — 93005 ELECTROCARDIOGRAM TRACING: CPT

## 2025-03-25 PROCEDURE — 99284 EMERGENCY DEPT VISIT MOD MDM: CPT

## 2025-03-25 PROCEDURE — 93010 ELECTROCARDIOGRAM REPORT: CPT

## 2025-03-25 PROCEDURE — 85025 COMPLETE CBC W/AUTO DIFF WBC: CPT

## 2025-03-25 PROCEDURE — 96374 THER/PROPH/DIAG INJ IV PUSH: CPT

## 2025-03-25 PROCEDURE — 96361 HYDRATE IV INFUSION ADD-ON: CPT

## 2025-03-25 PROCEDURE — 83690 ASSAY OF LIPASE: CPT

## 2025-03-25 PROCEDURE — 99284 EMERGENCY DEPT VISIT MOD MDM: CPT | Mod: 25

## 2025-03-25 PROCEDURE — 80053 COMPREHEN METABOLIC PANEL: CPT

## 2025-03-25 RX ORDER — ONDANSETRON HCL/PF 4 MG/2 ML
1 VIAL (ML) INJECTION
Qty: 6 | Refills: 0
Start: 2025-03-25 | End: 2025-03-26

## 2025-03-25 RX ORDER — SODIUM CHLORIDE 9 G/1000ML
1000 INJECTION, SOLUTION INTRAVENOUS ONCE
Refills: 0 | Status: COMPLETED | OUTPATIENT
Start: 2025-03-25 | End: 2025-03-25

## 2025-03-25 RX ORDER — MECLIZINE HCL 12.5 MG
25 TABLET ORAL ONCE
Refills: 0 | Status: COMPLETED | OUTPATIENT
Start: 2025-03-25 | End: 2025-03-25

## 2025-03-25 RX ORDER — ONDANSETRON HCL/PF 4 MG/2 ML
4 VIAL (ML) INJECTION ONCE
Refills: 0 | Status: COMPLETED | OUTPATIENT
Start: 2025-03-25 | End: 2025-03-25

## 2025-03-25 RX ORDER — MECLIZINE HCL 12.5 MG
1 TABLET ORAL
Qty: 12 | Refills: 0
Start: 2025-03-25 | End: 2025-03-28

## 2025-03-25 RX ORDER — MECLIZINE HCL 12.5 MG
1 TABLET ORAL
Qty: 9 | Refills: 0
Start: 2025-03-25 | End: 2025-03-27

## 2025-03-25 RX ADMIN — SODIUM CHLORIDE 1000 MILLILITER(S): 9 INJECTION, SOLUTION INTRAVENOUS at 15:58

## 2025-03-25 RX ADMIN — SODIUM CHLORIDE 1000 MILLILITER(S): 9 INJECTION, SOLUTION INTRAVENOUS at 17:07

## 2025-03-25 RX ADMIN — Medication 25 MILLIGRAM(S): at 20:14

## 2025-03-25 RX ADMIN — Medication 25 MILLIGRAM(S): at 17:12

## 2025-03-25 RX ADMIN — Medication 4 MILLIGRAM(S): at 17:12

## 2025-03-25 NOTE — ED ADULT NURSE NOTE - PATIENT'S PREFERRED PRONOUN
This is a re-send--Previously sent to the wrong pharmacy-pt says needs to go to [facility] instead.  
Her/She

## 2025-03-25 NOTE — ED ADULT NURSE NOTE - NSFALLRISKINTERV_ED_ALL_ED

## 2025-03-25 NOTE — ED PROVIDER NOTE - PHYSICAL EXAMINATION
Vital signs reviewed.  CONSTITUTIONAL: NAD   HEAD: Normocephalic; atraumatic  EYES: EOMI, PERRL    MOUTH/THROAT:  dry mucus membrane   NECK: Trachea midline   CV: Normal S1, S2; no audible murmurs  RESP: normal work of breathing; CTAB   ABD: soft, non-distended; non-tender to palpation   : Deferred  MSK/EXT: trace LE edema b/l  SKIN: No rashes on exposed skin surfaces  NEURO: Moves all extremities spontaneously with no focal deficits, speech is appropriate  PSYCH: calm Vital signs reviewed.  CONSTITUTIONAL: NAD   HEAD: Normocephalic; atraumatic  EYES: EOMI, PERRL, no nystagmus   MOUTH/THROAT:  dry mucus membrane   NECK: Trachea midline   CV: Normal S1, S2; no audible murmurs  RESP: normal work of breathing; CTAB   ABD: soft, non-distended; non-tender to palpation   : Deferred  MSK/EXT: trace LE edema b/l  SKIN: No rashes on exposed skin surfaces  NEURO: Moves all extremities spontaneously with no focal deficits, speech is appropriate  PSYCH: calm

## 2025-03-25 NOTE — ED ADULT NURSE REASSESSMENT NOTE - COMFORT CARE
po fluid  is well tolerated/ambulated to bathroom/meal provided/po fluids offered/side rails up/treatment delay explained/wait time explained/warm blanket provided

## 2025-03-25 NOTE — ED ADULT NURSE NOTE - CARDIO ASSESSMENT
Mr. Анна Low is a 73 y.o. y/o male with history of a fib who presents today for anticoagulation monitoring and adjustment. Mitral valve replaced with porcine valve 1/2015  Patient was in the hospital on 6/6/15 for a TIA  Patient Reported Findings:  Yes     No  [x]   []       Patient verifies current dosing regimen as listed  []   [x]       S/S bleeding/bruising/swelling/SOB  []   [x]       Blood in urine or stool  [x]   []       Procedures scheduled in the future at this time- Is having a GI tube placed for feeding tube on Wed 11/8 at [facility], was instructed to hold warfarin 5 days prior. doing chemo and radiation weekly for 7 weeks  [x]   []       Missed Dose has been off warfarin for ~2 weeks as patient was supposed to have feeding tube placed 2 weeks ago but couldn't ans so just stayed off warfarin. Notified cardiology who stated to continue to hold warfarin. Had restart for 2 days then went back to holding as instructed to hold 5 days prior to procedure. Patient states that cardiology said no need to bridge, even though was bridged for colonoscopy historically   []   [x]       Extra Dose  [x]   []       Change in medications- began chemo 2 weeks ago, had a lot of AE (headaches, was bedridden, developed chemo rash). Was prescribed doxycycline 100 mg and clindamycin topical which doxycycline will interact with warfarin. Will be on until rash goes away. [x]   []       Change in health/diet/appetite---diagnosed with cancer since past appointment. 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 Patient having hearing issues and will likely lose hearing in one ear completely.  Has lost 14 pounds  []   [x]       Change in alcohol use  []   [x]       Change in activity  []   [x]       Hospital admission  []   [x]       Emergency department visit   []   [x]       Other complaints     Clinical Outcomes:  Yes     No  []   [x]       Major bleeding event  []   [x]       Thromboembolic event    Duration of warfarin ---

## 2025-03-25 NOTE — ED PROVIDER NOTE - PATIENT PORTAL LINK FT
You can access the FollowMyHealth Patient Portal offered by [facility] by registering at the following website: [email or web address]. By joining [employer]’s FollowMyHealth portal, you will also be able to view your health information using other applications (apps) compatible with our system.

## 2025-03-25 NOTE — ED PROVIDER NOTE - ATTENDING CONTRIBUTION TO CARE
75-year-old female history of hypertension, remote history of vertigo over 10 years ago, with after mentioned history here with lightheadedness since this morning.  She is very clear that it is not room spinning like however she prefers not to move sometimes worsens the symptoms.  The lightheadedness preceded the vomiting and diarrhea however she has had multiple episodes of both.  No fevers.  No abdominal pain at present but she did have some epigastric pain prior to the diarrhea which resolved with GI symptoms.    Patient is overall well-appearing.  The description suggest this is more GI related but will consider intracranial causes if no clinical improvement after hydration and after labs.  Though GI PCR was considered earlier unlikely to  is less than 24 hours of symptoms.

## 2025-03-25 NOTE — ED ADULT NURSE REASSESSMENT NOTE - NS ED NURSE REASSESS RESPONSE TO CARE
Chronic Disease Management Services   Office Progress Note    Ruth Ann Cruz is a 70 year old year old female patient presenting for a follow-up visit for diabetes management. Patient's spouse is present at today's appointment.    Referring Provider: Santos Simmons MD, last seen 10/2/2023    Supervising Provider: Royal Durham DO  Order Expiration Date: 6/29/2024     Today's visit: Patient feeling okay since last CDM visit. Still having significant back pain due to herniated disc but scheduled to have stimulator implanted onto spinal cord for nerve pain. Patient had this on for 5 days, and noticed significant improvement in back pain. AGP well within goal with TIR at 96%.     Time of visit: 10:30am - 10:50am     Visit History:   3/13/2024 - Ozempic inc'd to 1 mg SC weekly  2/7/2024 - inc'd Ozempic to 0.5 mg SC weekly, dec'd Tresiba to 54 units SC daily  1/3/2024 - inc'd Tresiba  11/8/2023 - assisted patient w/ filling out PAP for Ozempic  10/11/2023 - decreased metformin to 500 mg PO BID  8/14/2023 - first Dexcom download, increased metformin XR to 1000 mg PO QAM and 500 mg PO QPM  7/26/2023 - initial CDM visit; increased Tresiba to 60 units SC QPM    PMH:  Past Medical History:   Diagnosis Date    Arthritis     osteoarthritis    CAD (coronary artery disease)     sees Dr. Mccormick CAth 4/4/05 - PTCA/stent to LAD - .  6/3/10 - 2.75 x12 mm Taxus stent to LAD, just distal to prior stent with PTCA to ostial diag stenosis.  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    HPI:  General Symptoms:      (-)  Palpitations  (+) HA: had one yesterday; no others in two weeks   (+) SOB: upon exertion   (+) Fatigue  (+) Dizziness: has vertigo  (-) CP    DM Symptoms:   (-) Polyuria   (-) Polydipsia   (-) Polyphagia   (-) Blurry vision   (+) Numbness/tingling: feels in toes due to back pain; in R leg    Allergies/Intolerances:    ALLERGIES:   Allergen Reactions    Amoxicillin Other (See Comments)    Adhesive   (Environmental) RASH    Lisinopril Cough     Current DM Medications:  Tresiba 42 units SC QPM   Metformin  mg PO BID  Ozempic 1 mg SC weekly    Past DM Medications  Victoza (cost)     MEDS:  Current Outpatient Medications   Medication Sig Dispense Refill    carvedilol (COREG) 3.125 MG tablet Take 1 tablet by mouth in the morning and 1 tablet in the evening. Take with meals. 180 tablet 3    VITAMIN D, ERGOCALCIFEROL, PO Take by mouth daily.      aspirin (ECOTRIN) 81 MG EC tablet Take 1 tablet by mouth daily.      clopidogrel (PLAVIX) 75 MG tablet Take 1 tablet by mouth daily. 90 tablet 1    venlafaxine XR (EFFEXOR XR) 37.5 MG 24 hr capsule Take 2 capsules by mouth daily. 180 capsule 1    Semaglutide, 1 MG/DOSE, 4 MG/3ML Solution Pen-injector Inject 1 mg into the skin every 7 days. Indications: Type 2 Diabetes 9 mL 1    furosemide (LASIX) 20 MG tablet Take x 3 days and then stop 30 tablet 1    atorvastatin (LIPITOR) 80 MG tablet Take 1 tablet by mouth daily. 90 tablet 3    famotidine (PEPCID) 40 MG tablet TAKE 1 TABLET BY MOUTH IN THE  MORNING AND 1 TABLET BY MOUTH IN THE EVENING (Patient taking differently: Take 40 mg by mouth daily.) 180 tablet 3    botulinum toxin type A (Botox) 200 units Recon Soln Inject 155 Units into the muscle every 3 months. Indications: Migraine Headache      insulin degludec (Tresiba FlexTouch) 100 UNIT/ML pen-injector Inject 45 Units into the skin daily. 45 mL 3    albuterol 108 (90 Base) MCG/ACT inhaler Use 2 inh every 4 hours prn wheezing 3 each 3    metFORMIN (GLUCOPHAGE-XR) 500 MG 24  feeling better hr tablet Take 1 tablet by mouth in the morning and 1 tablet in the evening. Take with meals. 180 tablet 3    erythromycin (ILOTYCIN) ophthalmic ointment APPLY TO SURGICAL EYELID TWICE DAILY FOR 7 DAYS AFTER SURGERY (Patient not taking: Reported on 4/22/2024)      tiZANidine (ZANAFLEX) 4 MG tablet Take 1 tablet by mouth at bedtime. 15 tablet 0    gabapentin (NEURONTIN) 300 MG capsule Take 1 capsule by mouth in the morning and 1 capsule at noon and 1 capsule in the evening. 270 capsule 3    prednisoLONE acetate (PRED FORTE) 1 % ophthalmic suspension Place 1 drop into both eyes 4 times daily. 5 mL 0    nitroGLYCERIN (NITROSTAT) 0.4 MG sublingual tablet DISSOLVE 1 TABLET UNDER THE  TONGUE EVERY 5 MINUTES AS NEEDED FOR CHEST PAIN. MAX OF 3 TABLETS IN 15 MINUTES. CALL 911 IF PAIN  PERSISTS. 75 tablet 4    Ferrous Sulfate (Iron) 325 (65 Fe) MG Tab       BD Pen Needle Susan U/F 32G X 4 MM Misc USE TO INJECT INSULIN 1 TIMES  DAILY . REMOVE NEEDLE COVER TO  EXPOSE NEEDLE BEFORE INJECTING 100 each 0    OneTouch Verio test strip Test blood sugar 2 times daily. Diagnosis: E11.65. Meter: One Touch Verio 200 strip 3    Lancets (OneTouch Delica Plus [id number]) Misc USE AND DISCARD 1 LANCET TOTEST BLOOD SUGAR TWO TIMES A  each 3    Blood Glucose Monitoring Suppl (OneTouch Verio) w/Device Kit 1 Device 2 times daily. E11.65, Onetouch Verio 1 kit 0    Ascorbic Acid (VITAMIN C) 500 MG tablet Take 500 mg by mouth.      zinc sulfate (ZINCATE) 220 (50 Zn) MG capsule Take 220 mg by mouth.      Calcium Carbonate-Vit D-Min (CALCIUM 1200 PO) Take  by mouth. - Oral      Multiple Vitamins-Minerals (OCUVITE PO) Take  by mouth.   - Oral      ergocalciferol (DRISDOL) 39351 units capsule        No current facility-administered medications for this visit.     Medication Adherence:   Medications reconciled per patient’s memory  Taken medications today? Yes   Missed doses of medications: No   Patient uses a pillbox? Yes      Diet/Exercise:  Diet  Breakfast: fruit, roasted cactus  Lunch: vegetables (steamed or raw with dressing), beans, boiled cactus; doesn't like meat very much  Dinner: about the same as lunch; avoids rice, or will eat small portion; chicken, tuna  Snacks: coffee  Drinks: coffee (w/ cream and sometimes splenda), water, Crystal Lite (sugar-free)   Exercise: active around the house; does Vesna    Social History:  Nicotine Use: Denies use  EtOH: Denies use     Routine Health Maintenance:  Microalbumin/creatinine ratio: Last documented: 3/13/2024; up to date  Dilated eye exam: Last documented: 1/12/2024; up to date  Foot exam: Last documented: 4/7/2021; not up to date; due  Aspirin 81 mg daily for cardioprotection: not taking   Statin: taking atorvastatin 80 mg PO daily    ACEI/ARB: not taking; on carvedilol 3.125 mg PO BID  Vaccinations:  Influenza: Last vaccination date: 10/2/2023; up to date  Pneumococcal: PPSV23 (7/13/2005, 7/10/2008, 1/15/2016, 10/22/2020); PCV 13 (10/21/2021); up to date  Depression screening: Last documented: 4/22/2024; up to date    Vitals: There were no vitals taken for this visit.    CGM data:       LABS:  Component      Latest Ref Rng 11/1/2023  4:47 PM 3/13/2024  7:37 AM   Sodium      135 - 145 mmol/L 138  138    Potassium      3.4 - 5.1 mmol/L 4.1  3.8    Chloride      97 - 110 mmol/L 101  100    CO2      21 - 32 mmol/L 30  30    ANION GAP      7 - 19 mmol/L 11  12    Glucose      70 - 99 mg/dL 120 (H)  81    BUN      6 - 20 mg/dL 14  10    Creatinine      0.51 - 0.95 mg/dL 0.65  0.71    Glomerular Filtration Rate      >=60  >90  >90    BUN/CREATININE RATIO      7 - 25  22  14    CALCIUM      8.4 - 10.2 mg/dL 9.0  8.6    TOTAL BILIRUBIN      0.2 - 1.0 mg/dL 0.3  0.4    AST/SGOT      <=37 Units/L 27  20    ALT/SGPT      <64 Units/L 38  39    ALK PHOSPHATASE      45 - 117 Units/L 62  59    Albumin      3.6 - 5.1 g/dL 3.5 (L)  3.7    TOTAL PROTEIN      6.4 - 8.2 g/dL 6.5  6.6    GLOBULIN       2.0 - 4.0 g/dL 3.0  2.9    A/G Ratio, Serum      1.0 - 2.4  1.2  1.3    CHOLESTEROL      <=199 mg/dL  151    TRIGLYCERIDE      <=149 mg/dL  126    HDL      >=50 mg/dL  50    CALCULATED LDL      <=129 mg/dL  76    CALCULATED NON HDL      mg/dL  101    CHOL/HDL      <=4.4   3.0    GLYCOHEMOGLOBIN A1C      4.5 - 5.6 % 7.1 (H)  7.0 (H)       Legend:  (H) High  (L) Low    The 10-year ASCVD risk score (Foster OLEARY, et al., 2019) is: 18.3%    Values used to calculate the score:      Age: 70 years      Sex: Female      Is Non- : No      Diabetic: Yes      Tobacco smoker: No      Systolic Blood Pressure: 122 mmHg      Is BP treated: Yes      HDL Cholesterol: 48 mg/dL      Total Cholesterol: 161 mg/dL    Assessment/Plan:  1)  Type 2 DM  Goals: A1c <7%, TIR >70%, TAR <25%, TBR <5%   Patient's A1cis at goal at 7%  AGP not at goal  TIR 4%  TAR 96%  TBR 0%  Recommend to continue metformin  mg PO BID, Ozempic 1 mg SC weekly, and Tresiba 42 units SC daily   Recommend to continue using CGM  Repeat A1C every 6 months, CMP every 6 months     Pt was provided with an updated medication list with all current medications and instructions on how and what time to take.     Follow-up:   PharmD follow up: 7/25/2024  PCP follow up: not scheduled      Time Spent on Visit:  20 minutes were spent via face to face discussion related to the medical management of the patient.    Ksenia Randall, PharmD, [facility]    Patient Privacy Notice  The 21st Century Cures Act makes medical notes like these available to patients in the interest of transparency. Please be advised this is a medical document. Medical documents are intended to carry relevant information and the clinical opinion of the practitioner. The medical note is used as communication between medical providers and may appear blunt or direct. The medical note is written in medical language and may contain abbreviations or verbiage that are unfamiliar.

## 2025-03-25 NOTE — ED PROVIDER NOTE - NSFOLLOWUPINSTRUCTIONS_ED_ALL_ED_FT
Vertigo  The outer and inner ear showing the eardrum and ear canal.  Vertigo is the feeling that you or the things around you are moving or spinning when they're not. It's different than feeling dizzy. It can also cause:  Loss of balance.  Trouble standing or walking.  Nausea and vomiting.  This feeling can come and go at any time. It can last from a few seconds to minutes or even hours. It may go away on its own or be treated with medicine.    What are the types of vertigo?  There are two types of vertigo:  Peripheral vertigo happens when parts of your inner ear don't work like they should. This is the more common type.  Central vertigo happens when your brain and spinal cord don't work like they should.  Your health care provider will do tests to find out what kind of vertigo you have. This will help them decide on the right treatment for you.    Follow these instructions at home:  Eating and drinking    Drink enough fluid to keep your pee (urine) pale yellow.  Do not drink alcohol.  Activity    When you get up in the morning, first sit up on the side of the bed. When you feel okay, stand slowly while holding onto something.  Move slowly. Avoid sudden body or head movements.  Avoid certain positions, as told by your provider.  Use a cane if you have trouble standing or walking.  Sit down right away if you feel unsteady.  Place items in your home so they're easy for you to reach without bending or leaning over.  Return to normal activities when you're told. Ask what things are safe for you to do.  General instructions    Take your medicines only as told by your provider.  Contact a health care provider if:  Your medicines don't help or make your vertigo worse.  You get new symptoms.  You have a fever.  You have nausea or vomiting.  Your family or friends spot any changes in how you're acting.  A part of your body goes numb.  You feel tingling and prickling in a part of your body.  You get very bad headaches.  Get help right away if:  You're always dizzy or you faint.  You have a stiff neck.  You have trouble moving or speaking.  Your hands, arms, or legs feel weak.  Your hearing or eyesight changes.  These symptoms may be an emergency. Call 911 right away.  Do not wait to see if the symptoms will go away.  Do not drive yourself to the hospital.  This information is not intended to replace advice given to you by your health care provider. Make sure you discuss any questions you have with your health care provider.    Vertigo  The outer and inner ear showing the eardrum and ear canal.  Vertigo is the feeling that you or the things around you are moving or spinning when they're not. It's different than feeling dizzy. It can also cause:  Loss of balance.  Trouble standing or walking.  Nausea and vomiting.  This feeling can come and go at any time. It can last from a few seconds to minutes or even hours. It may go away on its own or be treated with medicine.    What are the types of vertigo?  There are two types of vertigo:  Peripheral vertigo happens when parts of your inner ear don't work like they should. This is the more common type.  Central vertigo happens when your brain and spinal cord don't work like they should.  Your health care provider will do tests to find out what kind of vertigo you have. This will help them decide on the right treatment for you.    Follow these instructions at home:  Eating and drinking    Drink enough fluid to keep your pee (urine) pale yellow.  Do not drink alcohol.  Activity    When you get up in the morning, first sit up on the side of the bed. When you feel okay, stand slowly while holding onto something.  Move slowly. Avoid sudden body or head movements.  Avoid certain positions, as told by your provider.  Use a cane if you have trouble standing or walking.  Sit down right away if you feel unsteady.  Place items in your home so they're easy for you to reach without bending or leaning over.  Return to normal activities when you're told. Ask what things are safe for you to do.  General instructions    Take your medicines only as told by your provider.  Contact a health care provider if:  Your medicines don't help or make your vertigo worse.  You get new symptoms.  You have a fever.  You have nausea or vomiting.  Your family or friends spot any changes in how you're acting.  A part of your body goes numb.  You feel tingling and prickling in a part of your body.  You get very bad headaches.  Get help right away if:  You're always dizzy or you faint.  You have a stiff neck.  You have trouble moving or speaking.  Your hands, arms, or legs feel weak.  Your hearing or eyesight changes.  These symptoms may be an emergency. Call 911 right away.  Do not wait to see if the symptoms will go away.  Do not drive yourself to the hospital.  This information is not intended to replace advice given to you by your health care provider. Make sure you discuss any questions you have with your health care provider.

## 2025-03-25 NOTE — ED ADULT NURSE NOTE - OBJECTIVE STATEMENT
Patient  is  alert  and  oriented  x4.  Color is  good  and  skin warm  to  touch.  She is  c/o  dizziness, nausea, vomiting  and  diarrhea.  She  appears  very  weak.

## 2025-03-25 NOTE — ED PROVIDER NOTE - CLINICAL SUMMARY MEDICAL DECISION MAKING FREE TEXT BOX
75-year-old female with history of hypertension, status post tubal ligation, laparoscopic cholecystectomy presented with lightheadedness since this morning around 6:15 AM.  Followed by over 10 episodes of NBNB vomiting and watery diarrhea.  Denies abdominal pain, fever, chills, chest pain, shortness of breath.  Recent sick contacts.  Denies vertigo.    Vitals on presentation nonactionable.  Exam as above.  Presentation low concern for mesenteric ischemia,  bowel obstruction.  Possible gastroenteritis.   we will check labs, EKG. GI PCR if able to provide stool.  Symptomatic management.  Dispo pending workup. 75-year-old female with history of hypertension, status post tubal ligation, laparoscopic cholecystectomy presented with lightheadedness since this morning around 6:15 AM.  Followed by over 10 episodes of NBNB vomiting and watery diarrhea.  Denies abdominal pain, fever, chills, chest pain, shortness of breath.  Recent sick contacts.  Denies vertigo.    Vitals on presentation nonactionable.  Exam as above.  Presentation low concern for mesenteric ischemia,  bowel obstruction.  Possible gastroenteritis.   we will check labs, EKG. GI PCR if able to provide stool.  Symptomatic management.  Dispo pending workup.  -- 75-year-old female with history of hypertension, status post tubal ligation, laparoscopic cholecystectomy presented with lightheadedness since this morning around 6:15 AM.  Followed by over 10 episodes of NBNB vomiting and watery diarrhea.  Denies abdominal pain, fever, chills, chest pain, shortness of breath.  Recent sick contacts.  Denies vertigo.    Vitals on presentation nonactionable.  Exam as above.  HINTs c/w peripheral vertigo. Presentation low concern for mesenteric ischemia,  bowel obstruction.  Possible gastroenteritis.   we will check labs, EKG. GI PCR if able to provide stool.  Symptomatic management.  Dispo pending workup.  --

## 2025-03-25 NOTE — ED PROVIDER NOTE - PROGRESS NOTE DETAILS
Argenis Jaramillo, Attending Physician: I personally reviewed and interpreted the EKG.  EKG normal sinus rhythm with a rate of 82 bpm.  Normal MD, QRS, QTc intervals.  No ST elevation or depression.  No Q waves.  No concerning T wave inversions. NEWTON Hernandez MD PGY-2: pt reassessed, reports "internal dizziness" improved. Now feeling better, given food/water for PO challenge NEWTON Hernandez MD PGY-2: tolerated food/water, nausea resolved. no more episode of diarrhea since ED presentation, However still feeling 'wobbly" with ambulation, does not feel comfortable walking unassisted. Will trial additional meclizine and re assess Argenis Jaramillo, Attending Physician: Pt ambulated and feels improved (not 100%). Will dc with zofran/meclizine.

## 2025-03-25 NOTE — ED ADULT NURSE REASSESSMENT NOTE - CONDITION
Quick Note      Patient Name: Reddy Castellano   Patient ID: [id number]   Sex: Male   YOB: 1934    Primary Care Provider: Agustina Gutierrez MD   Referring Provider: Christopher Lozano MD    Visit Date: May 27, 2021    Provider: Agustian Gutierrez MD   Location: [facility]   Location Address: [address] [place] [address], [address]  [place], [place]  [id number]   Location Phone: [phone]          History Of Present Illness     [facility]     TaskID: [id number]    Task Subject: CCM Note May 2021    Task Comments:    Date: May 19 2021  2:37PM     Creator: kaela  I returne the call to the patient and shared with him the appointment place and time and he verbalized understanding. The patient stated that his BP was WNL at this time with no major issues. He stated that he was not SOA or having any difficulties bvreathing . Patient stated he still uses his CPAP each night. H estated he felt as if this helped him sleep how ever he stated that his Pulm was going to order melatonin to help him rest better. No dosage known and meds not ordered yet so I did not place them on the patients chart. He stated that he had not been 100% satisfied with his current Pulm MD and was interested at some point in swithching . I shared that I would be hapy to help if he made that decision and he agreed. We discussed meds and Med list was reconciled. The patient stated npo further needs at this time. ( call 18 min )    Date: May 19 2021  2:32PM     Creator: [email or web address]  Since the patient has had past visits with Dr. Kaiser I called the office and an appointment was set for 5-20-21 @ 10:15 AM . ( 6 min )    Date: May 19 2021  2:31PM     Creator: [email or web address]  The patient called and left message to return call.  called the patient and he stated that he felt as if he had a hernia and was requesting that an appointment be made with a uroloigist . I recoomended that he see a general surgion and he agreed. I stated that I would make a calll and  get him scheduled and return his call and he agreed. ( 8 min )             Plan  · Medications  o Medications have been Reconciled  o Transition of Care or Provider Policy            Electronically Signed by: Raul Grande MA -Author on May 27, 2021 01:59:16 PM   improved

## 2025-04-02 ENCOUNTER — APPOINTMENT (OUTPATIENT)
Dept: CARDIOLOGY | Facility: CLINIC | Age: 75
End: 2025-04-02
Payer: MEDICARE

## 2025-04-02 ENCOUNTER — NON-APPOINTMENT (OUTPATIENT)
Age: 75
End: 2025-04-02

## 2025-04-02 VITALS
HEIGHT: 60 IN | HEART RATE: 70 BPM | WEIGHT: 138 LBS | SYSTOLIC BLOOD PRESSURE: 126 MMHG | OXYGEN SATURATION: 98 % | BODY MASS INDEX: 27.09 KG/M2 | DIASTOLIC BLOOD PRESSURE: 78 MMHG

## 2025-04-02 DIAGNOSIS — R00.2 PALPITATIONS: ICD-10-CM

## 2025-04-02 DIAGNOSIS — I49.3 VENTRICULAR PREMATURE DEPOLARIZATION: ICD-10-CM

## 2025-04-02 DIAGNOSIS — E78.00 PURE HYPERCHOLESTEROLEMIA, UNSPECIFIED: ICD-10-CM

## 2025-04-02 PROCEDURE — G2211 COMPLEX E/M VISIT ADD ON: CPT

## 2025-04-02 PROCEDURE — 93306 TTE W/DOPPLER COMPLETE: CPT

## 2025-04-02 PROCEDURE — 93880 EXTRACRANIAL BILAT STUDY: CPT

## 2025-04-02 PROCEDURE — 99214 OFFICE O/P EST MOD 30 MIN: CPT

## 2025-04-02 PROCEDURE — 93000 ELECTROCARDIOGRAM COMPLETE: CPT

## 2025-04-30 ENCOUNTER — APPOINTMENT (OUTPATIENT)
Dept: OTOLARYNGOLOGY | Facility: CLINIC | Age: 75
End: 2025-04-30
Payer: MEDICARE

## 2025-04-30 VITALS
WEIGHT: 140 LBS | HEIGHT: 60 IN | HEART RATE: 66 BPM | DIASTOLIC BLOOD PRESSURE: 69 MMHG | TEMPERATURE: 98.2 F | BODY MASS INDEX: 27.48 KG/M2 | SYSTOLIC BLOOD PRESSURE: 110 MMHG | OXYGEN SATURATION: 96 %

## 2025-04-30 DIAGNOSIS — R42 DIZZINESS AND GIDDINESS: ICD-10-CM

## 2025-04-30 DIAGNOSIS — H90.3 SENSORINEURAL HEARING LOSS, BILATERAL: ICD-10-CM

## 2025-04-30 PROCEDURE — 99204 OFFICE O/P NEW MOD 45 MIN: CPT

## 2025-04-30 PROCEDURE — 92567 TYMPANOMETRY: CPT

## 2025-04-30 PROCEDURE — 92557 COMPREHENSIVE HEARING TEST: CPT

## 2025-04-30 RX ORDER — AMLODIPINE BESYLATE 2.5 MG/1
2.5 TABLET ORAL
Refills: 0 | Status: ACTIVE | COMMUNITY

## 2025-05-06 ENCOUNTER — APPOINTMENT (OUTPATIENT)
Dept: OTOLARYNGOLOGY | Facility: CLINIC | Age: 75
End: 2025-05-06

## 2025-05-13 ENCOUNTER — APPOINTMENT (OUTPATIENT)
Dept: CT IMAGING | Facility: CLINIC | Age: 75
End: 2025-05-13
Payer: SELF-PAY

## 2025-05-13 ENCOUNTER — OUTPATIENT (OUTPATIENT)
Dept: OUTPATIENT SERVICES | Facility: HOSPITAL | Age: 75
LOS: 1 days | End: 2025-05-13
Payer: SELF-PAY

## 2025-05-13 DIAGNOSIS — S82.142A DISPLACED BICONDYLAR FRACTURE OF LEFT TIBIA, INITIAL ENCOUNTER FOR CLOSED FRACTURE: Chronic | ICD-10-CM

## 2025-05-13 DIAGNOSIS — R00.2 PALPITATIONS: ICD-10-CM

## 2025-05-13 DIAGNOSIS — E78.00 PURE HYPERCHOLESTEROLEMIA, UNSPECIFIED: ICD-10-CM

## 2025-05-13 DIAGNOSIS — Z90.49 ACQUIRED ABSENCE OF OTHER SPECIFIED PARTS OF DIGESTIVE TRACT: Chronic | ICD-10-CM

## 2025-05-13 DIAGNOSIS — Z98.51 TUBAL LIGATION STATUS: Chronic | ICD-10-CM

## 2025-05-13 DIAGNOSIS — I49.3 VENTRICULAR PREMATURE DEPOLARIZATION: ICD-10-CM

## 2025-05-13 DIAGNOSIS — Z98.890 OTHER SPECIFIED POSTPROCEDURAL STATES: Chronic | ICD-10-CM

## 2025-05-13 PROCEDURE — 75571 CT HRT W/O DYE W/CA TEST: CPT | Mod: 26

## 2025-05-13 PROCEDURE — 75571 CT HRT W/O DYE W/CA TEST: CPT

## 2025-06-16 NOTE — ED PROVIDER NOTE - NORMAL, MLM
ASSESSMENT:      ICD-10-CM ICD-9-CM   1. Pain in both knees, unspecified chronicity  M25.561 719.46    M25.562    2. Bilateral knee pain  M25.561 719.46    M25.562    3. Acute pain of right knee  M25.561 719.46       PLAN:     Findings and treatment options were discussed with the patient regarding the diagnosis.   All questions were answered regarding Maricruz Macario 's painful knee.      Natural history and expected course discussed. Questions answered. Educational materials distributed. Reduction in offending activity. NSAIDs per medication orders. Arthrocentesis. See procedure note.  Right knee injection today.  Naproxen p.o. b.i.d..  We will set up 6 weeks of formal therapy.  Return to clinic in 6-8 weeks for recheck.  We will consider MRI if no better  There are no Patient Instructions on file for this visit.    IMAGING:   X-Ray Knee 3 View Bilateral  Result Date: 6/19/2025  HISTORY . Pain in right knee. Pain in left knee COMPARISON No prior studies submitted. TECHNIQUE XR KNEE 3 VIEW BILATERAL FINDINGS Bilateral AP, lateral and sunrise views of the knees were obtained.  The regional osseous structures are osteopenic.  There is no evidence of acute fracture or dislocation.  Moderate right medial tibiofemoral joint compartment narrowing is noted.  Severe bilateral lateral patellofemoral joint space narrowing is noted with associated mild subluxation of the patella laterally.  1.9 x 1.6 cm sclerotic focus within the proximal left tibia likely representing a small enchondroma.  The regional soft tissues are unremarkable.     IMPRESSION 1. Moderate right medial tibiofemoral joint compartment narrowing. 2. Severe bilateral lateral patellofemoral joint space narrowing with associated mild subluxation of the patella laterally. 3. A 1.9 x 1.6 cm sclerotic focus within the proximal left tibia likely representing a small enchondroma. Electronically signed by: Loyd Knox DO (June 19 2025 08:01 AM EST)    US  .OP RT CM called and spoke with patient regarding her breathing, medications and O2.  Adelita stated her sister just passed away.  I offered my condolences.     Adelita had a moist cough on the phone I encouraged her to take respiratory medications as prescribed and offered to call in a few weeks. She agreed it would be a better time and was appreciative.     OP RT CM will outreach in two weeks and Adelita has my contact information.    SOFT TISSUE LOWER EXTREMITY ([facility] ONLY)  Result Date: 5/27/2025  US SOFT TISSUE LOWER EXTREMITY Clinical Information: Fall with pain posterior to right knee TECHNIQUE: Targeted ultrasound assessment of the right popliteal fossa is performed with grayscale color Doppler and spectral analysis Comparison: Knee radiograph 5/27/2025 Findings: Targeted assessment demonstrates normal appearance of the vasculature with what appears to be a patent popliteal vein. The vasculature was not otherwise significantly assessed. No other acute or suspicious sonographic abnormality identified    Impression:   Negative study.    XR KNEE AP AND LATERAL LEFT ([facility] ONLY)  Result Date: 5/27/2025  HISTORY: Knee pain PREVIOUS: None FINDINGS:2 views of the left knee are submitted. There is a intramedullary calcified density in proximal tibia likely reflective of benign enchondroma. There is severe narrowing of the patellofemoral articulation with severe reactive sclerosis and hypertrophic change. No acute changes identified.    IMPRESSION:There is severe degenerative narrowing of the patellofemoral articulation with narrowing, reactive sclerosis and hypertrophic change. There is a benign sclerotic lesion in the proximal tibia likely reflective in chondroma.    XR KNEE AP AND LATERAL RIGHT ([facility] ONLY)  Result Date: 5/27/2025  HISTORY: Knee pain PREVIOUS: None FINDINGS:AP and lateral views the right knee are submitted. There is moderately severe degenerative narrowing of the patellofemoral articulation with narrowing, reactive sclerosis and hypertrophic change. The medial and lateral compartments are moderately well maintained. No acute changes identified.    IMPRESSION:There is moderately severe degenerative narrowing of the patellofemoral articulation with reactive sclerosis and hypertrophic changes. The medial and lateral compartments are moderately well maintained. No acute abnormality is identified.         CC: Knee pain    78 y.o. Female who  presents as a new patient to me for evaluation of  bilateral knee pain with the right being worse.    Occupation:   Pain has been present for several months.  Injury description Patient was moving in March. She carried boxes up stairs and felt a catch in the posterior aspect of her knee.   She state that pain got better and has since returned over the last months. .  Any twisting of the knee or pivoting she reports it is very painful and feels unstable.  Most of the pain is in the back of her knee.  Mechanical symptoms, such as clicking, locking, and popping are present.    Swelling and effusions  are not present today. She states she noticed some swelling initially.   The patient does  describe symptoms of knee instability, such as the knee buckling and the knee giving way.   Symptoms are worsened with activity.  Better with rest. Treatment thus far has included rest, activity modifications, and oral medications.    she  has not had formal physical therapy.  she has not had prior injections injections into the knee.   she has not had previous advanced imaging such as MRI.     Here today to discuss diagnosis and treatment options.          REVIEW OF SYSTEMS:   Constitution: Negative. Negative for chills, fever and night sweats.    Hematologic/Lymphatic: Negative for bleeding problem. Does not bruise/bleed easily.   Skin: Negative for dry skin, itching and rash.   Musculoskeletal: Negative for falls. Positive for knee pain and muscle weakness.     All other review of symptoms were reviewed and found to be noncontributory.     PAST MEDICAL HISTORY:   History reviewed. No pertinent past medical history.    PAST SURGICAL HISTORY:   History reviewed. No pertinent surgical history.    FAMILY HISTORY:   No family history on file.    SOCIAL HISTORY:   Social History[1]    MEDICATIONS:   Current Medications[2]    ALLERGIES:   Review of patient's allergies indicates:   Allergen Reactions    Opioids-methadone and related  Anxiety        PHYSICAL EXAMINATION:              Right Knee Exam     Inspection   Swelling: present  Bruising: absent    Tenderness   The patient is tender to palpation of the medial joint line.    Range of Motion   Extension:  normal   Flexion:  normal     Tests   Meniscus   [place]:  Medial - positive     Other   Sensation: normal    Vascular Exam     Right Pulses  Dorsalis Pedis:      2+            Orders Placed This Encounter   Procedures    Large Joint Aspiration/Injection: R supra patellar bursa     This order was created via procedure documentation    X-Ray Knee 3 View Bilateral     Standing Status:   Future     Number of Occurrences:   1     Expected Date:   6/16/2025     Expiration Date:   6/10/2026     May the Radiologist modify the order per protocol to meet the clinical needs of the patient?:   Yes     Release to patient:   Immediate    Ambulatory Referral/Consult to Physical Therapy     Standing Status:   Future     Expected Date:   6/23/2025     Expiration Date:   7/16/2026     Referral Priority:   Routine     Referral Type:   Physical Therapy     Referral Reason:   Specialty Services Required     Requested Specialty:   Physical Therapy     Number of Visits Requested:   1       Large Joint Aspiration/Injection: R supra patellar bursa    Date/Time: 6/16/2025 9:00 AM    Performed by: Aileen Urias FNP  Authorized by: Aileen Urias FNP    Consent Done?:  Yes (Verbal)  Indications:  Pain  Site marked: the procedure site was marked    Local anesthetic:  Bupivacaine 0.25% without epinephrine    Details:  Needle Size:  22 G  Location:  Knee  Site:  R supra patellar bursa  Medications:  40 mg triamcinolone acetonide 40 mg/mL  Patient tolerance:  Patient tolerated the procedure well with no immediate complications             [1]   Social History  Socioeconomic History    Marital status:    Tobacco Use    Smoking status: Never    Smokeless tobacco: Never   [2]   Current Outpatient Medications:      hydroCHLOROthiazide (HYDRODIURIL) 25 MG tablet, TAKE ONE TABLET BY MOUTH DAILY IN THE MORNING, Disp: , Rfl:     nebivoloL (BYSTOLIC) 10 MG Tab, Take 10 mg by mouth., Disp: , Rfl:     naproxen (NAPROSYN) 500 MG tablet, Take 1 tablet (500 mg total) by mouth 2 (two) times daily with meals., Disp: 60 tablet, Rfl: 3     wicho all pertinent systems normal

## 2025-06-17 ENCOUNTER — APPOINTMENT (OUTPATIENT)
Dept: OTOLARYNGOLOGY | Facility: CLINIC | Age: 75
End: 2025-06-17
Payer: MEDICARE

## 2025-06-17 PROCEDURE — 92547 SUPPLEMENTAL ELECTRICAL TEST: CPT

## 2025-06-17 PROCEDURE — 92537 CALORIC VSTBLR TEST W/REC: CPT

## 2025-06-17 PROCEDURE — 92584 ELECTROCOCHLEOGRAPHY: CPT

## 2025-06-17 PROCEDURE — 92567 TYMPANOMETRY: CPT

## 2025-06-17 PROCEDURE — 92540 BASIC VESTIBULAR EVALUATION: CPT

## 2025-06-25 ENCOUNTER — OUTPATIENT (OUTPATIENT)
Dept: OUTPATIENT SERVICES | Facility: HOSPITAL | Age: 75
LOS: 1 days | End: 2025-06-25
Payer: COMMERCIAL

## 2025-06-25 ENCOUNTER — APPOINTMENT (OUTPATIENT)
Dept: ULTRASOUND IMAGING | Facility: CLINIC | Age: 75
End: 2025-06-25
Payer: MEDICARE

## 2025-06-25 DIAGNOSIS — Z90.49 ACQUIRED ABSENCE OF OTHER SPECIFIED PARTS OF DIGESTIVE TRACT: Chronic | ICD-10-CM

## 2025-06-25 DIAGNOSIS — Z98.51 TUBAL LIGATION STATUS: Chronic | ICD-10-CM

## 2025-06-25 DIAGNOSIS — Z98.890 OTHER SPECIFIED POSTPROCEDURAL STATES: Chronic | ICD-10-CM

## 2025-06-25 DIAGNOSIS — I49.3 VENTRICULAR PREMATURE DEPOLARIZATION: ICD-10-CM

## 2025-06-25 DIAGNOSIS — E78.00 PURE HYPERCHOLESTEROLEMIA, UNSPECIFIED: ICD-10-CM

## 2025-06-25 DIAGNOSIS — S82.142A DISPLACED BICONDYLAR FRACTURE OF LEFT TIBIA, INITIAL ENCOUNTER FOR CLOSED FRACTURE: Chronic | ICD-10-CM

## 2025-06-25 DIAGNOSIS — R00.2 PALPITATIONS: ICD-10-CM

## 2025-06-25 PROCEDURE — 93925 LOWER EXTREMITY STUDY: CPT | Mod: 26

## 2025-06-25 PROCEDURE — 93925 LOWER EXTREMITY STUDY: CPT

## 2025-07-23 ENCOUNTER — APPOINTMENT (OUTPATIENT)
Dept: CARDIOLOGY | Facility: CLINIC | Age: 75
End: 2025-07-23
Payer: MEDICARE

## 2025-07-23 ENCOUNTER — NON-APPOINTMENT (OUTPATIENT)
Age: 75
End: 2025-07-23

## 2025-07-23 VITALS
BODY MASS INDEX: 27.48 KG/M2 | SYSTOLIC BLOOD PRESSURE: 108 MMHG | HEART RATE: 68 BPM | DIASTOLIC BLOOD PRESSURE: 68 MMHG | WEIGHT: 140 LBS | OXYGEN SATURATION: 97 % | HEIGHT: 60 IN

## 2025-07-23 DIAGNOSIS — I49.3 VENTRICULAR PREMATURE DEPOLARIZATION: ICD-10-CM

## 2025-07-23 DIAGNOSIS — E78.00 PURE HYPERCHOLESTEROLEMIA, UNSPECIFIED: ICD-10-CM

## 2025-07-23 DIAGNOSIS — R00.2 PALPITATIONS: ICD-10-CM

## 2025-07-23 LAB
CHOLEST SERPL-MCNC: 195 MG/DL
HDLC SERPL-MCNC: 61 MG/DL
LDLC SERPL-MCNC: 122 MG/DL
NONHDLC SERPL-MCNC: 134 MG/DL
TRIGL SERPL-MCNC: 66 MG/DL

## 2025-07-23 PROCEDURE — ZZZZZ: CPT

## 2025-07-23 PROCEDURE — 93000 ELECTROCARDIOGRAM COMPLETE: CPT

## 2025-07-23 PROCEDURE — G2211 COMPLEX E/M VISIT ADD ON: CPT

## 2025-07-23 PROCEDURE — 99214 OFFICE O/P EST MOD 30 MIN: CPT

## 2025-08-28 ENCOUNTER — APPOINTMENT (OUTPATIENT)
Dept: OTOLARYNGOLOGY | Facility: CLINIC | Age: 75
End: 2025-08-28
Payer: MEDICARE

## 2025-08-28 DIAGNOSIS — H90.3 SENSORINEURAL HEARING LOSS, BILATERAL: ICD-10-CM

## 2025-08-28 DIAGNOSIS — R42 DIZZINESS AND GIDDINESS: ICD-10-CM

## 2025-08-28 PROCEDURE — 99203 OFFICE O/P NEW LOW 30 MIN: CPT

## (undated) DEVICE — GLV 8 PROTEXIS (WHITE)

## (undated) DEVICE — SUT BIOSYN 4-0 18" P-12

## (undated) DEVICE — APPLICATOR SURGICEL LAP TROCAR POINT 2.5MM X 150MM

## (undated) DEVICE — DRAPE MAYO STAND 23"

## (undated) DEVICE — DRSG KLING 2"

## (undated) DEVICE — BLADE SCALPEL SAFETYLOCK #10

## (undated) DEVICE — TUBING STRYKEFLOW II SUCTION / IRRIGATOR

## (undated) DEVICE — SLING ARM CHIEFTAIN MESH LARGE

## (undated) DEVICE — TUBING INSUFFLATION LAP FILTER 10FT

## (undated) DEVICE — SUT MONOCRYL 4-0 18" P-3 UNDYED

## (undated) DEVICE — D HELP - CLEARVIEW CLEARIFY SYSTEM

## (undated) DEVICE — DRILL BIT SYNTHES ORTHO QC 3FLUTE 2.7X125MM

## (undated) DEVICE — DRSG TEGADERM 4X10"

## (undated) DEVICE — DRAPE TOP SHEET 53" X 101"

## (undated) DEVICE — DRAPE C ARM 41X140"

## (undated) DEVICE — GLV 7.5 PROTEXIS (WHITE)

## (undated) DEVICE — STOPCOCK 3 WAY W TUBE 35"

## (undated) DEVICE — VENODYNE/SCD SLEEVE CALF MEDIUM

## (undated) DEVICE — WARMING BLANKET LOWER ADULT

## (undated) DEVICE — ELCTR STRYKER NEPTUNE SMOKE EVACUATION PENCIL (GREEN)

## (undated) DEVICE — CATH IV SAFE INSYTE 14G X 1.75" (ORANGE)

## (undated) DEVICE — SOL IRR POUR NS 0.9% 500ML

## (undated) DEVICE — SOL IRR POUR H2O 250ML

## (undated) DEVICE — TAPE SILK 3"

## (undated) DEVICE — TUBING TRUWAVE PRESSURE MALE/FEMALE 72"

## (undated) DEVICE — DRSG CURITY GAUZE SPONGE 4 X 4" 12-PLY

## (undated) DEVICE — DRSG STERISTRIPS 0.5 X 4"

## (undated) DEVICE — DRSG OPSITE 2.5 X 2"

## (undated) DEVICE — MEDICATION LABELS W MARKER

## (undated) DEVICE — ENDOCATCH 10MM SPECIMEN POUCH

## (undated) DEVICE — DRSG MASTISOL

## (undated) DEVICE — DRAPE C ARM UNIVERSAL

## (undated) DEVICE — TROCAR APPLIED MEDICAL KII BALLOON BLUNT TIP 12MM X 100MM

## (undated) DEVICE — SUT FIBERWIRE #2 38" STRAND 1 BLUE 1 WHITE/BLACK

## (undated) DEVICE — SYR ASEPTO

## (undated) DEVICE — SUT PDS II 0 18" ENDOLOOP LIGATURE

## (undated) DEVICE — SUT POLYSORB 0 36" GU-46

## (undated) DEVICE — SUT NDL MAYO CATGUT 1/2 CIRCLE TAPER POINT 0.050" X 1.092"

## (undated) DEVICE — SYR LUER LOK 20CC

## (undated) DEVICE — Device

## (undated) DEVICE — GLV 7 PROTEXIS (WHITE)

## (undated) DEVICE — TROCAR COVIDIEN VERSAPORT BLADELESS OPTICAL 5MM STANDARD

## (undated) DEVICE — DRSG ACE BANDAGE 4" NS

## (undated) DEVICE — SYR LUER LOK 30CC

## (undated) DEVICE — POSITIONER FOAM EGG CRATE ULNAR 2PCS (PINK)

## (undated) DEVICE — TROCAR COVIDIEN VERSAONE FIXATION CANNULA 5MM

## (undated) DEVICE — GOWN TRIMAX LG

## (undated) DEVICE — WARMING BLANKET UPPER ADULT

## (undated) DEVICE — DRAPE MAYO STAND 30"

## (undated) DEVICE — SUT VICRYL 3-0 27" RB-1 UNDYED

## (undated) DEVICE — SUT FIBERWIRE #2 38" STRAND 1 BLUE T-5 TAPER

## (undated) DEVICE — PREP CHLORAPREP HI-LITE ORANGE 26ML

## (undated) DEVICE — VENODYNE/SCD SLEEVE CALF LARGE

## (undated) DEVICE — PACK TOTAL HIP (2 PACKS)

## (undated) DEVICE — DRSG XEROFORM 5 X 9"

## (undated) DEVICE — GLV 6.5 PROTEXIS (WHITE)

## (undated) DEVICE — TUBING IRRIGATION DAVOL SYSTEM X STREAM

## (undated) DEVICE — DRAPE TOWEL BLUE 17" X 24"

## (undated) DEVICE — DISSECTOR ENDO PEANUT 5MM

## (undated) DEVICE — TROCAR COVIDIEN BLUNT TIP HASSAN 10MM

## (undated) DEVICE — DRSG ACE BANDAGE 4"

## (undated) DEVICE — GLV 8.5 PROTEXIS (WHITE)

## (undated) DEVICE — SUT ORTHOCORD 2 36"

## (undated) DEVICE — SUT ETHIBOND 2-0 30" RB-1

## (undated) DEVICE — DISSECTOR COVIDIEN ROTICULATOR 5MM W MONOPOLAR CAUTERY

## (undated) DEVICE — DRSG WEBRIL 4"

## (undated) DEVICE — GLV 8 PROTEXIS (BLUE)

## (undated) DEVICE — SPECIMEN CONTAINER 100ML

## (undated) DEVICE — DRAPE INSTRUMENT POUCH 6.75" X 11"

## (undated) DEVICE — ELCTR CORD FOOTSWITCH 1PLR LAPSCP 10FT

## (undated) DEVICE — PACK ADVANCED LAPAROSCOPIC NS

## (undated) DEVICE — DRILL BIT SYNTHES ORTHO QC 2.5X110MM

## (undated) DEVICE — DRSG TELFA 3 X 8

## (undated) DEVICE — TUBING TUR 2 PRONG

## (undated) DEVICE — NDL BIOPSY MONOPTY 18G X 20CM

## (undated) DEVICE — DRAPE SPLIT SHEET 77" X 120"

## (undated) DEVICE — SHEARS COVIDIEN ENDO SHEAR 5MM X 31CM W UNIPOLAR CAUTERY